# Patient Record
Sex: FEMALE | Race: WHITE | NOT HISPANIC OR LATINO | Employment: FULL TIME | URBAN - METROPOLITAN AREA
[De-identification: names, ages, dates, MRNs, and addresses within clinical notes are randomized per-mention and may not be internally consistent; named-entity substitution may affect disease eponyms.]

---

## 2018-12-17 ENCOUNTER — OFFICE VISIT CONVERTED (OUTPATIENT)
Dept: FAMILY MEDICINE CLINIC | Facility: CLINIC | Age: 18
End: 2018-12-17
Attending: NURSE PRACTITIONER

## 2019-06-03 ENCOUNTER — OFFICE VISIT CONVERTED (OUTPATIENT)
Dept: FAMILY MEDICINE CLINIC | Facility: CLINIC | Age: 19
End: 2019-06-03
Attending: NURSE PRACTITIONER

## 2019-07-24 ENCOUNTER — OFFICE VISIT CONVERTED (OUTPATIENT)
Dept: FAMILY MEDICINE CLINIC | Facility: CLINIC | Age: 19
End: 2019-07-24
Attending: NURSE PRACTITIONER

## 2019-08-16 ENCOUNTER — OFFICE VISIT CONVERTED (OUTPATIENT)
Dept: FAMILY MEDICINE CLINIC | Facility: CLINIC | Age: 19
End: 2019-08-16
Attending: NURSE PRACTITIONER

## 2019-10-18 ENCOUNTER — CONVERSION ENCOUNTER (OUTPATIENT)
Dept: FAMILY MEDICINE CLINIC | Facility: CLINIC | Age: 19
End: 2019-10-18

## 2019-10-18 ENCOUNTER — OFFICE VISIT CONVERTED (OUTPATIENT)
Dept: FAMILY MEDICINE CLINIC | Facility: CLINIC | Age: 19
End: 2019-10-18
Attending: NURSE PRACTITIONER

## 2019-12-16 ENCOUNTER — HOSPITAL ENCOUNTER (OUTPATIENT)
Dept: FAMILY MEDICINE CLINIC | Facility: CLINIC | Age: 19
Discharge: HOME | End: 2019-12-16
Attending: NURSE PRACTITIONER

## 2019-12-16 ENCOUNTER — CONVERSION ENCOUNTER (OUTPATIENT)
Dept: FAMILY MEDICINE CLINIC | Facility: CLINIC | Age: 19
End: 2019-12-16

## 2019-12-16 ENCOUNTER — OFFICE VISIT CONVERTED (OUTPATIENT)
Dept: FAMILY MEDICINE CLINIC | Facility: CLINIC | Age: 19
End: 2019-12-16
Attending: NURSE PRACTITIONER

## 2019-12-17 LAB
C TRACH RRNA CVX QL NAA+PROBE: NOT DETECTED
N GONORRHOEA DNA SPEC QL NAA+PROBE: NOT DETECTED

## 2019-12-18 LAB
CONV LAST MENSTURAL PERIOD: NORMAL
SPECIMEN SOURCE: NORMAL
SPECIMEN SOURCE: NORMAL
THIN PREP CVX: NORMAL

## 2020-01-10 ENCOUNTER — OFFICE VISIT CONVERTED (OUTPATIENT)
Dept: FAMILY MEDICINE CLINIC | Facility: CLINIC | Age: 20
End: 2020-01-10
Attending: NURSE PRACTITIONER

## 2020-01-10 ENCOUNTER — HOSPITAL ENCOUNTER (OUTPATIENT)
Dept: FAMILY MEDICINE CLINIC | Facility: CLINIC | Age: 20
Discharge: HOME | End: 2020-01-10
Attending: NURSE PRACTITIONER

## 2020-01-10 LAB
ALBUMIN SERPL-MCNC: 4.2 G/DL (ref 3.5–5)
ALBUMIN/GLOB SERPL: 1.4 {RATIO} (ref 1.4–2.6)
ALP SERPL-CCNC: 75 U/L (ref 42–98)
ALT SERPL-CCNC: 25 U/L (ref 10–40)
ANION GAP SERPL CALC-SCNC: 17 MMOL/L (ref 8–19)
AST SERPL-CCNC: 23 U/L (ref 15–50)
BASOPHILS # BLD AUTO: 0.05 10*3/UL (ref 0–0.2)
BASOPHILS NFR BLD AUTO: 0.7 % (ref 0–3)
BILIRUB SERPL-MCNC: 0.51 MG/DL (ref 0.2–1.3)
BUN SERPL-MCNC: 18 MG/DL (ref 5–25)
BUN/CREAT SERPL: 25 {RATIO} (ref 6–20)
CALCIUM SERPL-MCNC: 9.8 MG/DL (ref 8.7–10.4)
CHLORIDE SERPL-SCNC: 102 MMOL/L (ref 99–111)
CONV ABS IMM GRAN: 0.01 10*3/UL (ref 0–0.2)
CONV CO2: 24 MMOL/L (ref 22–32)
CONV IMMATURE GRAN: 0.1 % (ref 0–1.8)
CONV TOTAL PROTEIN: 7.3 G/DL (ref 6.3–8.2)
CREAT UR-MCNC: 0.73 MG/DL (ref 0.5–0.9)
DEPRECATED RDW RBC AUTO: 42.5 FL (ref 36.4–46.3)
EOSINOPHIL # BLD AUTO: 0.22 10*3/UL (ref 0–0.7)
EOSINOPHIL # BLD AUTO: 3.1 % (ref 0–7)
ERYTHROCYTE [DISTWIDTH] IN BLOOD BY AUTOMATED COUNT: 12.5 % (ref 11.7–14.4)
GFR SERPLBLD BASED ON 1.73 SQ M-ARVRAT: >60 ML/MIN/{1.73_M2}
GLOBULIN UR ELPH-MCNC: 3.1 G/DL (ref 2–3.5)
GLUCOSE SERPL-MCNC: 82 MG/DL (ref 65–99)
HCT VFR BLD AUTO: 46.6 % (ref 37–47)
HGB BLD-MCNC: 14.5 G/DL (ref 12–16)
LYMPHOCYTES # BLD AUTO: 2.83 10*3/UL (ref 1–5)
LYMPHOCYTES NFR BLD AUTO: 39.3 % (ref 20–45)
MCH RBC QN AUTO: 28.7 PG (ref 27–31)
MCHC RBC AUTO-ENTMCNC: 31.1 G/DL (ref 33–37)
MCV RBC AUTO: 92.1 FL (ref 81–99)
MONOCYTES # BLD AUTO: 0.6 10*3/UL (ref 0.2–1.2)
MONOCYTES NFR BLD AUTO: 8.3 % (ref 3–10)
NEUTROPHILS # BLD AUTO: 3.5 10*3/UL (ref 2–8)
NEUTROPHILS NFR BLD AUTO: 48.5 % (ref 30–85)
NRBC CBCN: 0 % (ref 0–0.7)
OSMOLALITY SERPL CALC.SUM OF ELEC: 289 MOSM/KG (ref 273–304)
PLATELET # BLD AUTO: 330 10*3/UL (ref 130–400)
PMV BLD AUTO: 9.2 FL (ref 9.4–12.3)
POTASSIUM SERPL-SCNC: 4.1 MMOL/L (ref 3.5–5.3)
RBC # BLD AUTO: 5.06 10*6/UL (ref 4.2–5.4)
SODIUM SERPL-SCNC: 139 MMOL/L (ref 135–147)
TSH SERPL-ACNC: 6.09 M[IU]/L (ref 0.27–4.2)
VIT B12 SERPL-MCNC: 475 PG/ML (ref 211–911)
WBC # BLD AUTO: 7.21 10*3/UL (ref 4.8–10.8)

## 2020-02-15 ENCOUNTER — HOSPITAL ENCOUNTER (OUTPATIENT)
Dept: OTHER | Facility: HOSPITAL | Age: 20
Discharge: HOME | End: 2020-02-15
Attending: NURSE PRACTITIONER

## 2020-02-15 LAB
T4 FREE SERPL-MCNC: 1.2 NG/DL (ref 0.9–1.8)
TSH SERPL-ACNC: 2 M[IU]/L (ref 0.27–4.2)

## 2020-02-18 LAB
CONV ANTI MICROSOMAL AB: 15 IU/ML (ref 0–34)
THYROGLOBULIN ANTIBODY: <1 IU/ML (ref 0–0.9)

## 2020-03-04 ENCOUNTER — CONVERSION ENCOUNTER (OUTPATIENT)
Dept: FAMILY MEDICINE CLINIC | Facility: CLINIC | Age: 20
End: 2020-03-04

## 2020-03-04 ENCOUNTER — OFFICE VISIT CONVERTED (OUTPATIENT)
Dept: FAMILY MEDICINE CLINIC | Facility: CLINIC | Age: 20
End: 2020-03-04
Attending: NURSE PRACTITIONER

## 2020-03-25 ENCOUNTER — TELEMEDICINE CONVERTED (OUTPATIENT)
Dept: FAMILY MEDICINE CLINIC | Facility: CLINIC | Age: 20
End: 2020-03-25
Attending: NURSE PRACTITIONER

## 2020-04-06 ENCOUNTER — TELEMEDICINE CONVERTED (OUTPATIENT)
Dept: FAMILY MEDICINE CLINIC | Facility: CLINIC | Age: 20
End: 2020-04-06
Attending: NURSE PRACTITIONER

## 2020-05-06 ENCOUNTER — TELEMEDICINE CONVERTED (OUTPATIENT)
Dept: FAMILY MEDICINE CLINIC | Facility: CLINIC | Age: 20
End: 2020-05-06
Attending: NURSE PRACTITIONER

## 2020-06-01 ENCOUNTER — TELEMEDICINE CONVERTED (OUTPATIENT)
Dept: FAMILY MEDICINE CLINIC | Facility: CLINIC | Age: 20
End: 2020-06-01
Attending: NURSE PRACTITIONER

## 2020-06-21 ENCOUNTER — HOSPITAL ENCOUNTER (OUTPATIENT)
Dept: LAB | Facility: HOSPITAL | Age: 20
Discharge: HOME | End: 2020-06-21
Attending: EMERGENCY MEDICINE

## 2020-12-15 ENCOUNTER — HOSPITAL ENCOUNTER (OUTPATIENT)
Dept: FAMILY MEDICINE CLINIC | Facility: CLINIC | Age: 20
Discharge: HOME | End: 2020-12-15
Attending: NURSE PRACTITIONER

## 2020-12-16 LAB
T4 FREE SERPL-MCNC: 1.1 NG/DL (ref 0.9–1.8)
TSH SERPL-ACNC: 1.67 M[IU]/L (ref 0.27–4.2)

## 2021-01-13 ENCOUNTER — OFFICE VISIT CONVERTED (OUTPATIENT)
Dept: FAMILY MEDICINE CLINIC | Facility: CLINIC | Age: 21
End: 2021-01-13
Attending: NURSE PRACTITIONER

## 2021-01-13 ENCOUNTER — HOSPITAL ENCOUNTER (OUTPATIENT)
Dept: FAMILY MEDICINE CLINIC | Facility: CLINIC | Age: 21
Discharge: HOME | End: 2021-01-13
Attending: NURSE PRACTITIONER

## 2021-01-14 LAB — SARS-COV-2 RNA SPEC QL NAA+PROBE: NOT DETECTED

## 2021-05-12 NOTE — PROGRESS NOTES
Progress Note      Patient Name: Giselle Talavera   Patient ID: [id number]   Sex: Female   YOB: 2000    Primary Care Provider: Ekaterina JOE    Visit Date: April 6, 2020    Provider: HEATH Dickinson   Location: [facility]   Location Address: [address] [place] [address]  [id number]   Location Phone: [phone]          Chief Complaint  · 1 Month Follow up for Anxiety and Depression      History Of Present Illness  Video Conferencing Visit  Giselle Talavera is a 19 year old /White female who is presenting for evaluation via video conferencing. Verbal consent obtained before beginning visit.   The following staff were present during this visit: Gretchen Delgado CMA   Giselle Talavera is a 19 year old /White female who presents for evaluation and treatment of:      1 Month Follow up  for Anxiety and Depression  Pt wants to talk about her medication and possible changes.  Feels like it works sometimes and then not others.    Pt feels like counseling is going well. Last session was Thursday. next session is Thursday.  Pt feels some better.           flu shot-10/2019       Past Medical History  Disease Name Date Onset Notes   Anxiety and depression 06/03/2019 --    Fatigue, unspecified type --  --    Pap smear for cervical cancer screening 12/16/19 WNL         Past Surgical History  Procedure Name Date Notes   Adenoidectomy --  --    Tonsilectomy --  --          Medication List  Name Date Started Instructions   imiquimod 5 % topical cream in packet 01/10/2020 apply to the affected area(s) before bedtime by topical route 3 times per week and leave on skin for 6 to 10 hours for 30 days   Loestrin Fe 1.5/30 (28-Day) 1.5 mg-30 mcg (21)/75 mg (7) oral tablet 01/10/2020 take 1 tablet by oral route once daily for 90 days   Synthroid 50 mcg oral tablet 03/04/2020 take 1 tablet (50 mcg) by oral route once daily on an empty stomach 30 minutes before breakfast for 90  days   Viibryd 40 mg oral tablet 03/25/2020 take 1 tablet (40 mg) by oral route once daily with food for 90 days   Vistaril 25 mg oral capsule 10/18/2019 take 1 capsule by oral route 3 times a day as needed anxiety   Vraylar 1.5 mg oral capsule 03/25/2020 take 1 capsule (1.5 mg) by oral route once daily for 30 days   Zyrtec 10 mg oral tablet 10/18/2019 take 1 tablet (10 mg) by oral route once daily for 90 days         Allergy List  Allergen Name Date Reaction Notes   NO KNOWN DRUG ALLERGIES --  --  --          Family Medical History  Disease Name Relative/Age Notes   Heart Disease  --    Hypertension Grandfather (maternal)/  Grandfather (paternal)/  Uncle/   --    Diabetes Grandfather (paternal)/   --          Social History  Finding Status Start/Stop Quantity Notes   Alcohol Never --/-- --  --    Tobacco Never --/-- --  --          Immunizations  NameDate Admin Mfg Trade Name Lot Number Route Inj VIS Given VIS Publication   Crxhoxcyi85/18/2019 [facility] Fluzone Quadrivalent [id number] IM RD 10/18/2019    Comments: NDC: [id number]   Ycvpglrtd26/17/2018 [facility] Fluzone Quadrivalent [id number] IM  12/17/2018 08/07/2015   Comments: Pt tolerated   Tdap12/17/2018 [facility] BOOSTRIX [id number] IM  12/17/2018 02/24/2015   Comments: Pt tolerated         Review of Systems  · Constitutional  o Denies  o : fatigue, fever, weight gain, weight loss, chills  · HENT  o Denies  o : ear pain, sore throat  · Cardiovascular  o Denies  o : chest Pain, palpitations, edema (swelling)  · Respiratory  o Denies  o : frequent cough, shortness of breath  · Gastrointestinal  o Denies  o : nausea, vomiting, changes in bowel habits  · Genitourinary  o Denies  o : dysuria, urinary frequency, urinary urgency, polyuria  · Neurologic  o Denies  o : headache, tingling or numbness, dizziness  · Psychiatric  o Admits  o : anxiety, depression, mood changes  o Denies  o : memory changes, SI/HI      Vitals  Date Time BP Position Site L\R Cuff Size HR RR TEMP (F) WT  HT  BMI kg/m2  "BSA m2 O2 Sat        03/04/2020 09:06 /94 Sitting    91 - R 18 97.9 191lbs 0oz 5'  2\" 34.93 1.95 98 %          Physical Examination  · Constitutional  o Appearance  o : NAD, alert and oriented, pleasant  · Skin and Subcutaneous Tissue  o General Inspection  o : Color normal, no rash, good turgor  · Neurologic  o Mental Status Examination  o :   § Orientation  § : grossly oriented to person, place and time     pt no longer noted to have flat affect.           Assessment  · Anxiety disorder     300.00/F41.9  · Depression     311/F32.9  · Mood disorder     296.90/F39      Plan  · Orders  o ACO-39: Current medications updated and reviewed () - - 04/06/2020  · Medications  o Vraylar 1.5 mg oral capsule   SIG: take 1 capsule (1.5 mg) by oral route once daily for 30 days   DISP: (30) capsules with 0 refills  Refilled on 04/06/2020     o Medications have been Reconciled  o Transition of Care or Provider Policy  · Instructions  o Patient was given an SSRI/SSNRI medication and warned of possible side effects of the medication including potential for increased risk of suicidal thoughts and feelings. Patient was instructed that if they begin to exhibit any of these effects they will discontinue the medication immediately and contact our office or the ER ASAP.  o Patient was educated/instructed on their diagnosis, treatment and medications prior to discharge from the clinic today.  · Disposition  o Return to Office in 1 month.            Electronically Signed by: HEATH Dickinson -Author on April 6, 2020 02:47:31 PM  "

## 2021-05-13 NOTE — PROGRESS NOTES
Progress Note      Patient Name: Giselle Talavera   Patient ID: [id number]   Sex: Female   YOB: 2000    Primary Care Provider: Ekaterina JOE    Visit Date: June 1, 2020    Provider: HEATH Dickinson   Location: [facility]   Location Address: [address] [place] [address]  [id number]   Location Phone: [phone]          Chief Complaint  · Discuss Medications      History Of Present Illness  Video Conferencing Visit  Giselle Talavera is a 19 year old /White female who is presenting for evaluation via video conferencing via Zoom. Verbal consent obtained before beginning visit.   The following staff were present during this visit: Gretchen Delgado CMA   Giselle Talavera is a 19 year old /White female who presents for evaluation and treatment of:      Follow up to discuss medications for Anxiety and Depression.  Pt reported she was doing well on her meds, but her Therapist Janie Sainz with [facility] thought pt is experiencing Hypomania.    Pt says she has a hard time sitting still. trouble focusing on school work. pt report compliance with meds.     hives - reports hives on forearms intermittantly. worse when around ovens at work and then spreads when she is anxious. denies any new lotions, soaps or detergents. only a dog in the house.       Past Medical History  Disease Name Date Onset Notes   Anxiety and depression 06/03/2019 --    Fatigue, unspecified type --  --    Pap smear for cervical cancer screening 12/16/19 WNL         Past Surgical History  Procedure Name Date Notes   Adenoidectomy --  --    Tonsilectomy --  --          Medication List  Name Date Started Instructions   imiquimod 5 % topical cream in packet 01/10/2020 apply to the affected area(s) before bedtime by topical route 3 times per week and leave on skin for 6 to 10 hours for 30 days   Loestrin Fe 1.5/30 (28-Day) 1.5 mg-30 mcg (21)/75 mg (7) oral tablet 01/10/2020 take 1  tablet by oral route once daily for 90 days   Synthroid 50 mcg oral tablet 03/04/2020 take 1 tablet (50 mcg) by oral route once daily on an empty stomach 30 minutes before breakfast for 90 days   Viibryd 40 mg oral tablet 05/06/2020 take 1 tablet (40 mg) by oral route once daily with food for 90 days   Vistaril 25 mg oral capsule 10/18/2019 take 1 capsule by oral route 3 times a day as needed anxiety   Vraylar 1.5 mg oral capsule 05/06/2020 take 1 capsule (1.5 mg) by oral route once daily for 30 days   Zyrtec 10 mg oral tablet 10/18/2019 take 1 tablet (10 mg) by oral route once daily for 90 days         Allergy List  Allergen Name Date Reaction Notes   NO KNOWN DRUG ALLERGIES --  --  --          Family Medical History  Disease Name Relative/Age Notes   Heart Disease  --    Hypertension Grandfather (maternal)/  Grandfather (paternal)/  Uncle/   --    Diabetes Grandfather (paternal)/   --          Social History  Finding Status Start/Stop Quantity Notes   Alcohol Never --/-- --  --    Tobacco Never --/-- --  --          Immunizations  NameDate Admin Mfg Trade Name Lot Number Route Inj VIS Given VIS Publication   [id number]/18/2019 [facility] Fluzone Quadrivalent [id number] IM RD 10/18/2019    Comments: NDC: [id number]   Qoytbexah05/17/2018 [facility] Fluzone Quadrivalent [id number] IM  12/17/2018 08/07/2015   Comments: Pt tolerated   Tdap12/17/2018 [facility] BOOSTRIX [id number] IM  12/17/2018 02/24/2015   Comments: Pt tolerated         Review of Systems  · Constitutional  o Denies  o : fever, fatigue, weight loss, weight gain  · Cardiovascular  o Denies  o : lower extremity edema, claudication, chest pressure, palpitations  · Respiratory  o Denies  o : shortness of breath, wheezing, frequent cough, hemoptysis, dyspnea on exertion  · Gastrointestinal  o Denies  o : nausea, vomiting, diarrhea, constipation, abdominal pain  · Integument  o Admits  o : rash  · Psychiatric  o Admits  o : anxiety, depression  o Denies  o : suicidal ideation, homicidal  "ideation  · Allergic-Immunologic  o Admits  o : seasonal allergies  o Denies  o : eczema, urticaria      Vitals  Date Time BP Position Site L\R Cuff Size HR RR TEMP (F) WT  HT  BMI kg/m2 BSA m2 O2 Sat        03/04/2020 09:06 /94 Sitting    91 - R 18 97.9 191lbs 0oz 5'  2\" 34.93 1.95 98 %          Physical Examination  · Constitutional  o Appearance  o : NAD, alert and oriented, pleasant  · Respiratory  o Respiratory Effort  o : breathing unlabored, no accessory muscle use  · Skin and Subcutaneous Tissue  o General Inspection  o : Color normal, no rash, good turgor  · Neurologic  o Mental Status Examination  o :   § Orientation  § : grossly oriented to person, place and time          Assessment  · Anxiety and depression       Anxiety disorder, unspecified     300.00/F41.9  Major depressive disorder, single episode, unspecified     300.00/F32.9  · 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      Plan  · Orders  o ACO-39: Current medications updated and reviewed () - - 06/01/2020  · Medications  o Singulair 10 mg oral tablet   SIG: take 1 tablet (10 mg) by oral route once daily in the evening for 30 days   DISP: (30) tablets with 2 refills  Prescribed on 06/01/2020     o Viibryd 20 mg oral tablet   SIG: take 1 tablet (20 mg) by oral route once daily with food for 90 days   DISP: (90) tablets with 1 refills  Adjusted on 06/01/2020     o Vraylar 3 mg oral capsule   SIG: take 1 capsule by oral route daily for 30 days   DISP: (30) capsules with 1 refills  Adjusted on 06/01/2020     o Medications have been Reconciled  o Transition of Care or Provider Policy  · Instructions  o Patient was educated/instructed on their diagnosis, treatment and medications prior to discharge from the clinic today.  o Call the office with any concerns or questions.  · Disposition  o Follow up in 4-6 weeks            Electronically Signed by: HEATH Dickinson -Author on June 1, 2020 01:56:21 PM  "

## 2021-05-13 NOTE — PROGRESS NOTES
"   Progress Note      Patient Name: Giselle Talavera   Patient ID: [id number]   Sex: Female   YOB: 2000    Primary Care Provider: Ekaterina JOE    Visit Date: May 6, 2020    Provider: HEATH Dickinson   Location: [facility]   Location Address: [address] [place] [address]  [id number]   Location Phone: [phone]          Chief Complaint  · 1 Month Follow up for Anxiety and Depression      History Of Present Illness  Video Conferencing Visit  Giselle Talavera is a 19 year old /White female who is presenting for evaluation via video conferencing. Verbal consent obtained before beginning visit.   The following staff were present during this visit: Gretchen Delgado CMA   Giselle Talavera is a 19 year old /White female who presents for evaluation and treatment of:      1 Month Follow up  for Anxiety and Depression  Started on Viibryd and Vraylar. Pt said she feels she is starting to show symptoms of dimitrios. Pt reports being happy most of the time. Pt admits to some sad times and the other day \"having a mental breakdown\". pt reports she was tired and cried a lot.  Pt denies any si/hi.  Pt continues with counseling and is doing well. Last session was Thursday.  Pt is sleeping at night. Pt has not made any big unusual purchases.  But otherwise doing good on the medicine.      flu shot-10/2019         Past Medical History  Disease Name Date Onset Notes   Anxiety and depression 06/03/2019 --    Fatigue, unspecified type --  --    Pap smear for cervical cancer screening 12/16/19 WNL         Past Surgical History  Procedure Name Date Notes   Adenoidectomy --  --    Tonsilectomy --  --          Medication List  Name Date Started Instructions   imiquimod 5 % topical cream in packet 01/10/2020 apply to the affected area(s) before bedtime by topical route 3 times per week and leave on skin for 6 to 10 hours for 30 days   Loestrin Fe 1.5/30 (28-Day) 1.5 mg-30 mcg (21)/75 mg " (7) oral tablet 01/10/2020 take 1 tablet by oral route once daily for 90 days   Synthroid 50 mcg oral tablet 03/04/2020 take 1 tablet (50 mcg) by oral route once daily on an empty stomach 30 minutes before breakfast for 90 days   Viibryd 40 mg oral tablet 05/06/2020 take 1 tablet (40 mg) by oral route once daily with food for 90 days   Vistaril 25 mg oral capsule 10/18/2019 take 1 capsule by oral route 3 times a day as needed anxiety   Vraylar 1.5 mg oral capsule 05/06/2020 take 1 capsule (1.5 mg) by oral route once daily for 30 days   Zyrtec 10 mg oral tablet 10/18/2019 take 1 tablet (10 mg) by oral route once daily for 90 days         Allergy List  Allergen Name Date Reaction Notes   NO KNOWN DRUG ALLERGIES --  --  --          Family Medical History  Disease Name Relative/Age Notes   Heart Disease  --    Hypertension Grandfather (maternal)/  Grandfather (paternal)/  Uncle/   --    Diabetes Grandfather (paternal)/   --          Social History  Finding Status Start/Stop Quantity Notes   Alcohol Never --/-- --  --    Tobacco Never --/-- --  --          Immunizations  NameDate Admin Mfg Trade Name Lot Number Route Inj VIS Given VIS Publication   [id number]/18/2019 [facility] Fluzone Quadrivalent [id number] IM RD 10/18/2019    Comments: NDC: [id number]   [id number]/17/2018 [facility] Fluzone Quadrivalent [id number] IM  12/17/2018 08/07/2015   Comments: Pt tolerated   Tdap12/17/2018 [facility] BOOSTRIX [id number] IM  12/17/2018 02/24/2015   Comments: Pt tolerated         Review of Systems  · Constitutional  o Denies  o : fatigue, fever, weight gain, weight loss, chills  · Cardiovascular  o Denies  o : chest Pain, palpitations, edema (swelling)  · Respiratory  o Denies  o : frequent cough, shortness of breath  · Gastrointestinal  o Denies  o : nausea, vomiting, changes in bowel habits  · Neurologic  o Denies  o : headache, tingling or numbness, dizziness  · Psychiatric  o Admits  o : anxiety, depression  o Denies  o : mood changes, memory changes,  SI/HI  · Allergic-Immunologic  o Denies  o : eczema, seasonal allergies, urticaria      Physical Examination  · Constitutional  o Appearance  o : NAD, alert and oriented, pleasant  · Respiratory  o Respiratory Effort  o : breathing unlabored, no accessory muscle use  · Skin and Subcutaneous Tissue  o General Inspection  o : Color normal, no rash, good turgor  · Neurologic  o Mental Status Examination  o :   § Orientation  § : grossly oriented to person, place and time          Assessment  · Anxiety disorder     300.00/F41.9  · Depression     311/F32.9      Plan  · Orders  o ACO-39: Current medications updated and reviewed () - - 05/06/2020  · Medications  o Viibryd 40 mg oral tablet   SIG: take 1 tablet (40 mg) by oral route once daily with food for 90 days   DISP: (90) tablets with 1 refills  Adjusted on 05/06/2020     o Vraylar 1.5 mg oral capsule   SIG: take 1 capsule (1.5 mg) by oral route once daily for 30 days   DISP: (30) capsules with 5 refills  Adjusted on 05/06/2020     o Medications have been Reconciled  o Transition of Care or Provider Policy  · Instructions  o Patient was given an SSRI/SSNRI medication and warned of possible side effects of the medication including potential for increased risk of suicidal thoughts and feelings. Patient was instructed that if they begin to exhibit any of these effects they will discontinue the medication immediately and contact our office or the ER ASAP.  o Patient was educated/instructed on their diagnosis, treatment and medications prior to discharge from the clinic today.  o Call the office with any concerns or questions.  · Disposition  o Return to Office in 4 months.            Electronically Signed by: HEATH Dickinson -Author on May 6, 2020 02:40:15 PM

## 2021-05-14 VITALS
SYSTOLIC BLOOD PRESSURE: 137 MMHG | WEIGHT: 199.5 LBS | DIASTOLIC BLOOD PRESSURE: 82 MMHG | TEMPERATURE: 96 F | OXYGEN SATURATION: 97 % | HEART RATE: 94 BPM | HEIGHT: 62 IN | BODY MASS INDEX: 36.71 KG/M2

## 2021-05-14 NOTE — PROGRESS NOTES
Progress Note      Patient Name: Giselle Talavera   Patient ID: [id number]   Sex: Female   YOB: 2000    Primary Care Provider: Ekaterina JOE   Referring Provider: Ekaterina JOE    Visit Date: January 13, 2021    Provider: HEATH Dickinson   Location: [facility]   Location Address: [address] [place] [address]  [id number]   Location Phone: [phone]          Chief Complaint  · 1 Month Follow up for Anxiety, Depression and Hypothyroidism      History Of Present Illness  Giselle Talavera is a 20 year old /White female who presents for evaluation and treatment of:      1 Month Follow up  for Anxiety, Depression and Hypothyroidism  Last lab work done 12/15/20  Med refills needed    Pt also requested to have COVID test done for School.     LMP: 1.9.21, lasting 3 days with normal flow. denies btb.  pap 10.2019    flu shot- given today 1/13/2020     anxiety - pt is seeing psychiatry. taking Effexor, lamictal, buspar and Inderal prn.       Past Medical History  Disease Name Date Onset Notes   Anxiety and depression 06/03/2019 --    Fatigue, unspecified type --  --    Hypothyroidism --  --    Pap smear for cervical cancer screening 12/16/19 WNL         Past Surgical History  Procedure Name Date Notes   Adenoidectomy --  --    Tonsilectomy --  --          Medication List  Name Date Started Instructions   Loestrin Fe 1.5/30 (28-Day) 1.5 mg-30 mcg (21)/75 mg (7) oral tablet 01/13/2021 take 1 tablet by oral route once daily for 90 days   Singulair 10 mg oral tablet 06/01/2020 take 1 tablet (10 mg) by oral route once daily in the evening for 30 days   Synthroid 50 mcg oral tablet 01/13/2021 take 1 tablet (50 mcg) by oral route once daily on an empty stomach 30 minutes before breakfast for 90 days   Zyrtec 10 mg oral tablet 01/13/2021 take 1 tablet (10 mg) by oral route once daily for 90 days         Allergy List  Allergen Name Date Reaction Notes   NO  "KNOWN DRUG ALLERGIES --  --  --          Family Medical History  Disease Name Relative/Age Notes   Heart Disease  --    Hypertension Grandfather (maternal)/  Grandfather (paternal)/  Uncle/   --    Diabetes Grandfather (paternal)/   --          Social History  Finding Status Start/Stop Quantity Notes   Alcohol Never --/-- --  --    Tobacco Never --/-- --  --          Immunizations  NameDate Admin Mfg Trade Name Lot Number Route Inj VIS Given VIS Publication   Usksigbrs04/13/2021 PMC Fluzone Quadrivalent [id number] IM RD 01/13/2021 08/15/2019   Comments: pt tolerated well NDC [id number]   Tdap12/17/2018 SKB BOOSTRIX [id number] IM  12/17/2018 02/24/2015   Comments: Pt tolerated         Review of Systems  · Constitutional  o Denies  o : fatigue  · HENT  o Denies  o : headaches  · Cardiovascular  o Denies  o : chest pain, irregular heart beats, rapid heart rate, dyspnea on exertion  · Respiratory  o Denies  o : shortness of breath, wheezing, cough  · Gastrointestinal  o Denies  o : nausea, vomiting, diarrhea, constipation, abdominal pain, blood in stools, melena  · Genitourinary  o Denies  o : frequency, dysuria, hematuria  · Integument  o Denies  o : rash, new skin lesions  · Psychiatric  o Admits  o : anxiety, depression  o Denies  o : suicidal ideation, homicidal ideation      Vitals  Date Time BP Position Site L\R Cuff Size HR RR TEMP (F) WT  HT  BMI kg/m2 BSA m2 O2 Sat FR L/min FiO2 HC       01/10/2020 11:58 /82 Sitting    88 - R 12  186lbs 2oz 5'  2\" 34.04 1.92 98 %      03/04/2020 09:06 /94 Sitting    91 - R 18 97.9 191lbs 0oz 5'  2\" 34.93 1.95 98 %      01/13/2021 02:18 /82 Sitting    94 - R  96 199lbs 8oz 5'  2\" 36.49 1.99 97 %  21%          Physical Examination  · Constitutional  o Appearance  o : well-nourished, in no acute distress  · Eyes  o Conjunctivae  o : conjunctivae normal  o Sclerae  o : sclerae white  o Pupils and Irises  o : pupils equal and round  o Eyelids/Ocular Adnexae  o : eyelid " appearance normal, no exudates present  · Neck  o Inspection/Palpation  o : normal appearance, no masses or tenderness, trachea midline  o Range of Motion  o : cervical range of motion within normal limits  o Thyroid  o : gland size normal, nontender, no nodules or masses present on palpation  · Respiratory  o Respiratory Effort  o : breathing unlabored  o Inspection of Chest  o : normal appearance  o Auscultation of Lungs  o : normal breath sounds throughout inspiration and expiration  · Cardiovascular  o Heart  o :   § Auscultation of Heart  § : regular rate and rhythm, no murmurs, gallops or rubs  · Gastrointestinal  o Abdominal Examination  o : abdomen nontender to palpation, tone normal without rigidity or guarding, no masses present, bowel sounds present in all four quadrants  · Lymphatic  o Neck  o : no lymphadenopathy present  · Skin and Subcutaneous Tissue  o General Inspection  o : no rashes or lesions present, no areas of discoloration  o Body Hair  o : hair normal for age, general body hair distribution normal for age  o Digits and Nails  o : no clubbing, cyanosis, deformities or edema present, normal appearing nails  · Neurologic  o Mental Status Examination  o :   § Orientation  § : grossly oriented to person, place and time  o Gait and Station  o : normal gait, able to stand without difficulty  · Psychiatric  o Judgement and Insight  o : judgment and insight intact  o Mood and Affect  o : mood normal, affect appropriate          Assessment  · Need for influenza vaccination     V04.81/Z23  · Anxiety disorder     300.00/F41.9  · Depression     311/F32.9  · Encounter for contraceptive management     V25.9/Z30.9  · Hypothyroidism     244.9/E03.9  · Screening examination for infectious disease     V75.9/Z11.9      Plan  · Orders  o Immunization Admin Fee (Single) ([facility]) (91607) - V04.81/Z23 - 01/13/2021  o Fluzone Quadrivalent Vaccine, age 6 months + (74285) - V04.81/Z23 - 01/13/2021   Vaccine - Influenza;  Dose: 0.5; Site: Right Deltoid; Route: Intramuscular; Date: 01/13/2021 14:59:00; Exp: 06/30/2021; Lot: [id number]; Mfg: sanofi pasteur; TradeName: Fluzone Quadrivalent; Administered By: Teresita Gil MA; Comment: pt tolerated well NDC [id number]  o ACO-14: Influenza immunization administered or previously received () - V04.81/Z23 - 01/13/2021  o ACO-39: Current medications updated and reviewed (, 1159F) - - 01/13/2021  o [facility] NCOV2 (send-out) (38398) - - 01/13/2021  · Medications  o Loestrin Fe 1.5/30 (28-Day) 1.5 mg-30 mcg (21)/75 mg (7) oral tablet   SIG: take 1 tablet by oral route once daily for 90 days   DISP: (3) Package with 3 refills  Refilled on 01/13/2021     o Synthroid 50 mcg oral tablet   SIG: take 1 tablet (50 mcg) by oral route once daily on an empty stomach 30 minutes before breakfast for 90 days   DISP: (90) Tablet with 1 refills  Refilled on 01/13/2021     o Zyrtec 10 mg oral tablet   SIG: take 1 tablet (10 mg) by oral route once daily for 90 days   DISP: (90) Tablet with 3 refills  Refilled on 01/13/2021     o Medications have been Reconciled  o Transition of Care or Provider Policy  · Instructions  o Birth control pills need to be taken at the same time daily. Vomiting, diarrhea, antibiotics and seizure medication make these pills less effective. If any of these happen during a pack use other form of birth control until start a new pack. Break through bleeding is any bleeding during the active pills in the pack. If this occurs use another form of birth control. If you miss a pill or take one late use another form of birth control until you start a new pack. Abdominal Pain, Chest pain, headaches (not relieved by Tylenol), Leg pain or vision changes need to be reported immediately to your provider.   o Patient was educated/instructed on their diagnosis, treatment and medications prior to discharge from the clinic today.  o Call the office with any concerns or  questions.  · Disposition  o Return to Office in 1 year.            Electronically Signed by: HEATH Dickinson -Author on February 19, 2021 10:58:47 AM

## 2021-05-15 VITALS
HEART RATE: 84 BPM | WEIGHT: 178 LBS | OXYGEN SATURATION: 98 % | HEIGHT: 62 IN | BODY MASS INDEX: 32.76 KG/M2 | DIASTOLIC BLOOD PRESSURE: 84 MMHG | RESPIRATION RATE: 22 BRPM | TEMPERATURE: 98.1 F | SYSTOLIC BLOOD PRESSURE: 118 MMHG

## 2021-05-15 VITALS
BODY MASS INDEX: 32.76 KG/M2 | HEART RATE: 77 BPM | HEIGHT: 62 IN | TEMPERATURE: 98.1 F | SYSTOLIC BLOOD PRESSURE: 122 MMHG | WEIGHT: 178 LBS | OXYGEN SATURATION: 98 % | RESPIRATION RATE: 20 BRPM | DIASTOLIC BLOOD PRESSURE: 72 MMHG

## 2021-05-15 VITALS
HEIGHT: 62 IN | WEIGHT: 186.12 LBS | BODY MASS INDEX: 34.25 KG/M2 | HEART RATE: 88 BPM | OXYGEN SATURATION: 98 % | SYSTOLIC BLOOD PRESSURE: 132 MMHG | DIASTOLIC BLOOD PRESSURE: 82 MMHG | RESPIRATION RATE: 12 BRPM

## 2021-05-15 VITALS
SYSTOLIC BLOOD PRESSURE: 126 MMHG | HEART RATE: 91 BPM | BODY MASS INDEX: 35.15 KG/M2 | WEIGHT: 191 LBS | OXYGEN SATURATION: 98 % | DIASTOLIC BLOOD PRESSURE: 94 MMHG | HEIGHT: 62 IN | TEMPERATURE: 97.9 F | RESPIRATION RATE: 18 BRPM

## 2021-05-15 VITALS
HEART RATE: 91 BPM | OXYGEN SATURATION: 96 % | BODY MASS INDEX: 32.82 KG/M2 | HEIGHT: 62 IN | DIASTOLIC BLOOD PRESSURE: 85 MMHG | RESPIRATION RATE: 12 BRPM | SYSTOLIC BLOOD PRESSURE: 129 MMHG | WEIGHT: 178.37 LBS

## 2021-05-15 VITALS
TEMPERATURE: 97.8 F | RESPIRATION RATE: 24 BRPM | HEART RATE: 61 BPM | WEIGHT: 164 LBS | OXYGEN SATURATION: 97 % | SYSTOLIC BLOOD PRESSURE: 128 MMHG | BODY MASS INDEX: 30.18 KG/M2 | DIASTOLIC BLOOD PRESSURE: 73 MMHG | HEIGHT: 62 IN

## 2021-05-15 VITALS
HEART RATE: 116 BPM | SYSTOLIC BLOOD PRESSURE: 128 MMHG | HEIGHT: 62 IN | WEIGHT: 188 LBS | OXYGEN SATURATION: 96 % | BODY MASS INDEX: 34.6 KG/M2 | DIASTOLIC BLOOD PRESSURE: 93 MMHG | RESPIRATION RATE: 12 BRPM

## 2021-05-15 VITALS
SYSTOLIC BLOOD PRESSURE: 128 MMHG | OXYGEN SATURATION: 98 % | WEIGHT: 162 LBS | HEIGHT: 62 IN | TEMPERATURE: 98.1 F | DIASTOLIC BLOOD PRESSURE: 85 MMHG | HEART RATE: 87 BPM | BODY MASS INDEX: 29.81 KG/M2 | RESPIRATION RATE: 30 BRPM

## 2021-11-16 ENCOUNTER — TELEPHONE (OUTPATIENT)
Dept: FAMILY MEDICINE CLINIC | Facility: CLINIC | Age: 21
End: 2021-11-16

## 2021-11-16 DIAGNOSIS — Z30.40 ENCOUNTER FOR SURVEILLANCE OF CONTRACEPTIVES, UNSPECIFIED CONTRACEPTIVE: Primary | ICD-10-CM

## 2021-11-16 RX ORDER — NORETHINDRONE ACETATE AND ETHINYL ESTRADIOL .03; 1.5 MG/1; MG/1
1 TABLET ORAL DAILY
Qty: 90 EACH | Refills: 1 | Status: SHIPPED | OUTPATIENT
Start: 2021-11-16 | End: 2022-06-22 | Stop reason: SDUPTHER

## 2021-11-16 NOTE — TELEPHONE ENCOUNTER
Caller: Giselle Talavera    Relationship: Self    Best call back number: [phone]    Requested Prescriptions:   LOESTRIN 1.5MG     Pharmacy where request should be sent: [facility] [place] [facility] -  [place], [place] - [address] [facility] - [phone]  - [phone] FX     THE PATIENT WOULD LIKE TO BE NOTIFIED WHEN THIS MEDICATION HAS BEEN SENT TO THE PHARMACY.      Does the patient have less than a 3 day supply:  [] Yes  [x] No    Eufemia Mann Rep   11/16/21 10:11 EST

## 2021-12-09 ENCOUNTER — OFFICE VISIT (OUTPATIENT)
Dept: FAMILY MEDICINE CLINIC | Facility: CLINIC | Age: 21
End: 2021-12-09

## 2021-12-09 VITALS
WEIGHT: 187 LBS | DIASTOLIC BLOOD PRESSURE: 80 MMHG | SYSTOLIC BLOOD PRESSURE: 121 MMHG | TEMPERATURE: 98.2 F | HEIGHT: 62 IN | HEART RATE: 85 BPM | BODY MASS INDEX: 34.41 KG/M2 | OXYGEN SATURATION: 99 %

## 2021-12-09 DIAGNOSIS — E03.9 HYPOTHYROIDISM, UNSPECIFIED TYPE: ICD-10-CM

## 2021-12-09 DIAGNOSIS — F41.9 ANXIETY AND DEPRESSION: ICD-10-CM

## 2021-12-09 DIAGNOSIS — F32.A ANXIETY AND DEPRESSION: ICD-10-CM

## 2021-12-09 DIAGNOSIS — Z79.899 MEDICATION MANAGEMENT: ICD-10-CM

## 2021-12-09 DIAGNOSIS — Z30.09 ENCOUNTER FOR OTHER GENERAL COUNSELING OR ADVICE ON CONTRACEPTION: ICD-10-CM

## 2021-12-09 DIAGNOSIS — Z00.00 ANNUAL PHYSICAL EXAM: Primary | ICD-10-CM

## 2021-12-09 PROBLEM — R53.83 FATIGUE: Status: ACTIVE | Noted: 2021-12-09

## 2021-12-09 PROBLEM — Z12.4 PAP SMEAR FOR CERVICAL CANCER SCREENING: Status: ACTIVE | Noted: 2019-12-16

## 2021-12-09 LAB
ALBUMIN SERPL-MCNC: 4.4 G/DL (ref 3.5–5.2)
ALBUMIN/GLOB SERPL: 1.8 G/DL
ALP SERPL-CCNC: 60 U/L (ref 39–117)
ALT SERPL W P-5'-P-CCNC: 21 U/L (ref 1–33)
ANION GAP SERPL CALCULATED.3IONS-SCNC: 12.8 MMOL/L (ref 5–15)
AST SERPL-CCNC: 19 U/L (ref 1–32)
BASOPHILS # BLD AUTO: 0.04 10*3/MM3 (ref 0–0.2)
BASOPHILS NFR BLD AUTO: 0.5 % (ref 0–1.5)
BILIRUB SERPL-MCNC: 0.8 MG/DL (ref 0–1.2)
BUN SERPL-MCNC: 10 MG/DL (ref 6–20)
BUN/CREAT SERPL: 11.9 (ref 7–25)
CALCIUM SPEC-SCNC: 9.8 MG/DL (ref 8.6–10.5)
CHLORIDE SERPL-SCNC: 104 MMOL/L (ref 98–107)
CHOLEST SERPL-MCNC: 183 MG/DL (ref 0–200)
CO2 SERPL-SCNC: 22.2 MMOL/L (ref 22–29)
CREAT SERPL-MCNC: 0.84 MG/DL (ref 0.57–1)
DEPRECATED RDW RBC AUTO: 40.6 FL (ref 37–54)
EOSINOPHIL # BLD AUTO: 0.03 10*3/MM3 (ref 0–0.4)
EOSINOPHIL NFR BLD AUTO: 0.4 % (ref 0.3–6.2)
ERYTHROCYTE [DISTWIDTH] IN BLOOD BY AUTOMATED COUNT: 13.4 % (ref 12.3–15.4)
GFR SERPL CREATININE-BSD FRML MDRD: 84 ML/MIN/1.73
GLOBULIN UR ELPH-MCNC: 2.5 GM/DL
GLUCOSE SERPL-MCNC: 77 MG/DL (ref 65–99)
HBA1C MFR BLD: 5.11 % (ref 4.8–5.6)
HCT VFR BLD AUTO: 38.4 % (ref 34–46.6)
HDLC SERPL-MCNC: 49 MG/DL (ref 40–60)
HGB BLD-MCNC: 13.2 G/DL (ref 12–15.9)
IMM GRANULOCYTES # BLD AUTO: 0.03 10*3/MM3 (ref 0–0.05)
IMM GRANULOCYTES NFR BLD AUTO: 0.4 % (ref 0–0.5)
LDLC SERPL CALC-MCNC: 122 MG/DL (ref 0–100)
LDLC/HDLC SERPL: 2.49 {RATIO}
LYMPHOCYTES # BLD AUTO: 2.03 10*3/MM3 (ref 0.7–3.1)
LYMPHOCYTES NFR BLD AUTO: 24.1 % (ref 19.6–45.3)
MCH RBC QN AUTO: 29.1 PG (ref 26.6–33)
MCHC RBC AUTO-ENTMCNC: 34.4 G/DL (ref 31.5–35.7)
MCV RBC AUTO: 84.6 FL (ref 79–97)
MONOCYTES # BLD AUTO: 0.48 10*3/MM3 (ref 0.1–0.9)
MONOCYTES NFR BLD AUTO: 5.7 % (ref 5–12)
NEUTROPHILS NFR BLD AUTO: 5.81 10*3/MM3 (ref 1.7–7)
NEUTROPHILS NFR BLD AUTO: 68.9 % (ref 42.7–76)
NRBC BLD AUTO-RTO: 0.1 /100 WBC (ref 0–0.2)
PLATELET # BLD AUTO: 335 10*3/MM3 (ref 140–450)
PMV BLD AUTO: 9.5 FL (ref 6–12)
POTASSIUM SERPL-SCNC: 4.4 MMOL/L (ref 3.5–5.2)
PROLACTIN SERPL-MCNC: 18.1 NG/ML (ref 4.79–23.3)
PROT SERPL-MCNC: 6.9 G/DL (ref 6–8.5)
RBC # BLD AUTO: 4.54 10*6/MM3 (ref 3.77–5.28)
SODIUM SERPL-SCNC: 139 MMOL/L (ref 136–145)
T4 FREE SERPL-MCNC: 1.56 NG/DL (ref 0.93–1.7)
TRIGL SERPL-MCNC: 61 MG/DL (ref 0–150)
TSH SERPL DL<=0.05 MIU/L-ACNC: 1.1 UIU/ML (ref 0.27–4.2)
VLDLC SERPL-MCNC: 12 MG/DL (ref 5–40)
WBC NRBC COR # BLD: 8.42 10*3/MM3 (ref 3.4–10.8)

## 2021-12-09 PROCEDURE — 84443 ASSAY THYROID STIM HORMONE: CPT | Performed by: NURSE PRACTITIONER

## 2021-12-09 PROCEDURE — 80053 COMPREHEN METABOLIC PANEL: CPT | Performed by: NURSE PRACTITIONER

## 2021-12-09 PROCEDURE — 80061 LIPID PANEL: CPT | Performed by: NURSE PRACTITIONER

## 2021-12-09 PROCEDURE — 36415 COLL VENOUS BLD VENIPUNCTURE: CPT | Performed by: NURSE PRACTITIONER

## 2021-12-09 PROCEDURE — 85025 COMPLETE CBC W/AUTO DIFF WBC: CPT | Performed by: NURSE PRACTITIONER

## 2021-12-09 PROCEDURE — 84439 ASSAY OF FREE THYROXINE: CPT | Performed by: NURSE PRACTITIONER

## 2021-12-09 PROCEDURE — 83036 HEMOGLOBIN GLYCOSYLATED A1C: CPT | Performed by: NURSE PRACTITIONER

## 2021-12-09 PROCEDURE — 84146 ASSAY OF PROLACTIN: CPT | Performed by: NURSE PRACTITIONER

## 2021-12-09 PROCEDURE — 99395 PREV VISIT EST AGE 18-39: CPT | Performed by: NURSE PRACTITIONER

## 2021-12-09 RX ORDER — BUPROPION HCL 300 MG
TABLET, EXTENDED RELEASE 24 HR ORAL
COMMUNITY
Start: 2021-09-21 | End: 2023-03-27

## 2021-12-09 RX ORDER — NORETHINDRONE ACETATE AND ETHINYL ESTRADIOL AND FERROUS FUMARATE 1.5-30(21)
KIT ORAL
COMMUNITY
Start: 2021-09-20 | End: 2022-06-22 | Stop reason: SDUPTHER

## 2021-12-09 RX ORDER — LEVOTHYROXINE SODIUM 50 MCG
TABLET ORAL
COMMUNITY
Start: 2021-09-21 | End: 2023-03-27
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
## 2021-12-12 RX ORDER — LEVOTHYROXINE SODIUM 0.1 MG/1
100 TABLET ORAL DAILY
Qty: 90 TABLET | Refills: 3 | Status: SHIPPED | OUTPATIENT
Start: 2021-12-12 | End: 2023-03-27

## 2021-12-21 ENCOUNTER — OFFICE VISIT (OUTPATIENT)
Dept: OBSTETRICS AND GYNECOLOGY | Facility: CLINIC | Age: 21
End: 2021-12-21

## 2021-12-21 VITALS
SYSTOLIC BLOOD PRESSURE: 126 MMHG | BODY MASS INDEX: 31.41 KG/M2 | DIASTOLIC BLOOD PRESSURE: 85 MMHG | HEIGHT: 64 IN | WEIGHT: 184 LBS | HEART RATE: 90 BPM

## 2021-12-21 DIAGNOSIS — Z11.3 SCREEN FOR STD (SEXUALLY TRANSMITTED DISEASE): ICD-10-CM

## 2021-12-21 DIAGNOSIS — T74.21XD SEXUAL ASSAULT OF ADULT, SUBSEQUENT ENCOUNTER: ICD-10-CM

## 2021-12-21 DIAGNOSIS — Z30.017 ENCOUNTER FOR INITIAL PRESCRIPTION OF IMPLANTABLE SUBDERMAL CONTRACEPTIVE: Primary | ICD-10-CM

## 2021-12-21 PROBLEM — T74.21XA SEXUAL ASSAULT OF ADULT: Status: ACTIVE | Noted: 2021-12-21

## 2021-12-21 PROCEDURE — 87591 N.GONORRHOEAE DNA AMP PROB: CPT | Performed by: OBSTETRICS & GYNECOLOGY

## 2021-12-21 PROCEDURE — 99204 OFFICE O/P NEW MOD 45 MIN: CPT | Performed by: OBSTETRICS & GYNECOLOGY

## 2021-12-21 PROCEDURE — 87563 M. GENITALIUM AMP PROBE: CPT | Performed by: OBSTETRICS & GYNECOLOGY

## 2021-12-21 PROCEDURE — 87491 CHLMYD TRACH DNA AMP PROBE: CPT | Performed by: OBSTETRICS & GYNECOLOGY

## 2021-12-21 RX ORDER — BUPROPION HYDROCHLORIDE 150 MG/1
TABLET ORAL
COMMUNITY
Start: 2021-12-10 | End: 2023-03-27
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
## 2021-12-22 LAB
C TRACH RRNA UR QL NAA+PROBE: NORMAL
M GENITALIUM DNA UR QL NAA+PROBE: NORMAL
N GONORRHOEA RRNA UR QL NAA+PROBE: NORMAL

## 2022-03-31 ENCOUNTER — TELEPHONE (OUTPATIENT)
Dept: OBSTETRICS AND GYNECOLOGY | Facility: CLINIC | Age: 22
End: 2022-03-31

## 2022-04-01 ENCOUNTER — TELEPHONE (OUTPATIENT)
Dept: OBSTETRICS AND GYNECOLOGY | Facility: CLINIC | Age: 22
End: 2022-04-01

## 2022-06-22 DIAGNOSIS — Z30.40 ENCOUNTER FOR SURVEILLANCE OF CONTRACEPTIVES, UNSPECIFIED CONTRACEPTIVE: ICD-10-CM

## 2022-06-22 RX ORDER — NORETHINDRONE ACETATE AND ETHINYL ESTRADIOL AND FERROUS FUMARATE 1.5-30(21)
1 KIT ORAL DAILY
Qty: 28 TABLET | Refills: 2 | Status: SHIPPED | OUTPATIENT
Start: 2022-06-22 | End: 2022-06-23 | Stop reason: SDUPTHER

## 2022-06-22 NOTE — TELEPHONE ENCOUNTER
Caller: Giselle Talavera    Relationship: Self    Best call back number: [phone]     Requested Prescriptions:   Requested Prescriptions     Pending Prescriptions Disp Refills   • Loestrin Fe 1.5/30 1.5-30 MG-MCG tablet 28 tablet         Pharmacy where request should be sent: [facility][id number] [address] AT [facility] [phone] [facility] [phone] FX     Additional details provided by patient:     Does the patient have less than a 3 day supply:  [x] Yes  [] No    Eufemia ALDANA Rep   06/22/22 09:01 EDT

## 2022-06-23 RX ORDER — NORETHINDRONE ACETATE AND ETHINYL ESTRADIOL .03; 1.5 MG/1; MG/1
1 TABLET ORAL DAILY
Qty: 90 EACH | Refills: 1 | Status: SHIPPED | OUTPATIENT
Start: 2022-06-23 | End: 2022-10-27 | Stop reason: SDUPTHER

## 2022-06-23 NOTE — TELEPHONE ENCOUNTER
Caller: Giselle Talavera    Relationship to patient: Self    Best call back number: [phone]  OKAY TO LEAVE MESSAGE    Patient is needing: PATIENT CALLED IN AND WOULD LIKE TO KNOW IF SHE COULD BE PRESCRIBED A GENERIC EQUIVALENT FOR Loestrin Fe 1.5/30 1.5-30 MG-MCG tablet THAT IS LESS EXPENSIVE. PLEASE CALL AND ADVISE.      [id number] - [place], [place] - [address] [facility] - [phone] [facility] [phone] FX

## 2022-10-26 ENCOUNTER — TELEPHONE (OUTPATIENT)
Dept: FAMILY MEDICINE CLINIC | Facility: CLINIC | Age: 22
End: 2022-10-26

## 2022-10-26 DIAGNOSIS — Z30.40 ENCOUNTER FOR SURVEILLANCE OF CONTRACEPTIVES, UNSPECIFIED CONTRACEPTIVE: ICD-10-CM

## 2022-10-26 NOTE — TELEPHONE ENCOUNTER
Caller: Giselle Talavera    Relationship: Self    Best call back number: [phone]    What medication are you requesting: JANETTE 24 FE BIRTH CONTROL        If a prescription is needed, what is your preferred pharmacy and phone number: [facility] #[id number] [place] [address] [facility] AT [facility] - [phone] [facility] [phone]      Additional notes: PHARMACY ADVISED THIS WAS THE SAME AS HER PRIOR BIRTH CONTROL IT IS NOT ON HER MEDICATION LIST, BUT SHE SAYS SHE WAS PRESCRIBED THIS ONCE. PATIENT WILL BE OUT OF MEDICATION ON Friday, PLEASE NOTIFY WHEN SENT

## 2022-10-27 RX ORDER — NORETHINDRONE ACETATE AND ETHINYL ESTRADIOL .03; 1.5 MG/1; MG/1
1 TABLET ORAL DAILY
Qty: 90 EACH | Refills: 0 | Status: SHIPPED | OUTPATIENT
Start: 2022-10-27 | End: 2022-12-29 | Stop reason: SDUPTHER

## 2022-12-29 ENCOUNTER — TELEPHONE (OUTPATIENT)
Dept: FAMILY MEDICINE CLINIC | Facility: CLINIC | Age: 22
End: 2022-12-29

## 2022-12-29 DIAGNOSIS — Z30.40 ENCOUNTER FOR SURVEILLANCE OF CONTRACEPTIVES, UNSPECIFIED CONTRACEPTIVE: ICD-10-CM

## 2022-12-29 RX ORDER — CETIRIZINE HYDROCHLORIDE 10 MG/1
10 TABLET ORAL AS NEEDED
Qty: 90 TABLET | Refills: 2 | Status: SHIPPED | OUTPATIENT
Start: 2022-12-29

## 2022-12-29 RX ORDER — NORETHINDRONE ACETATE AND ETHINYL ESTRADIOL .03; 1.5 MG/1; MG/1
1 TABLET ORAL DAILY
Qty: 90 EACH | Refills: 0 | Status: SHIPPED | OUTPATIENT
Start: 2022-12-29 | End: 2023-03-14 | Stop reason: SDUPTHER

## 2022-12-29 NOTE — TELEPHONE ENCOUNTER
Caller: Giselle Talavera    Relationship: Self    Best call back number: [phone]    What is the best time to reach you: ANYTIME     Who are you requesting to speak with (clinical staff, provider,  specific staff member): NURSE     What was the call regarding: PATIENT IS NEEDING TO HAVE HER BIRTH CONTROL REFILLED  AND SENT TO [facility] IN [place] AT [address]     Do you require a callback: YES

## 2022-12-30 ENCOUNTER — TELEPHONE (OUTPATIENT)
Dept: FAMILY MEDICINE CLINIC | Facility: CLINIC | Age: 22
End: 2022-12-30

## 2022-12-30 NOTE — TELEPHONE ENCOUNTER
Caller: Giselle Talavera    Relationship: Self      What medication are you requesting: GENERIC VERSION ( JUNEL) [id number] - [place], [place] - [address] AT [facility] [phone] [facility] [phone]   [phone]      Have you had these symptoms before:    [x] Yes  [] No    Have you been treated for these symptoms before:   [x] Yes  [] No    If a prescription is needed, what is your preferred pharmacy and phone number: [facility] #[id number] - [place], [place] - [address] AT [facility] [phone] [facility] [phone]

## 2023-03-14 DIAGNOSIS — Z30.40 ENCOUNTER FOR SURVEILLANCE OF CONTRACEPTIVES, UNSPECIFIED CONTRACEPTIVE: ICD-10-CM

## 2023-03-14 RX ORDER — NORETHINDRONE ACETATE AND ETHINYL ESTRADIOL .03; 1.5 MG/1; MG/1
1 TABLET ORAL DAILY
Qty: 90 EACH | Refills: 0 | Status: SHIPPED | OUTPATIENT
Start: 2023-03-14 | End: 2023-03-27

## 2023-03-27 ENCOUNTER — OFFICE VISIT (OUTPATIENT)
Dept: FAMILY MEDICINE CLINIC | Facility: CLINIC | Age: 23
End: 2023-03-27
Payer: COMMERCIAL

## 2023-03-27 ENCOUNTER — CLINICAL SUPPORT (OUTPATIENT)
Dept: FAMILY MEDICINE CLINIC | Facility: CLINIC | Age: 23
End: 2023-03-27
Payer: COMMERCIAL

## 2023-03-27 VITALS
BODY MASS INDEX: 30.22 KG/M2 | HEART RATE: 83 BPM | SYSTOLIC BLOOD PRESSURE: 128 MMHG | DIASTOLIC BLOOD PRESSURE: 89 MMHG | WEIGHT: 170.6 LBS | TEMPERATURE: 98.1 F | OXYGEN SATURATION: 96 %

## 2023-03-27 DIAGNOSIS — F41.9 ANXIETY AND DEPRESSION: ICD-10-CM

## 2023-03-27 DIAGNOSIS — Z13.220 LIPID SCREENING: ICD-10-CM

## 2023-03-27 DIAGNOSIS — Z01.89 ENCOUNTER FOR ROUTINE LABORATORY TESTING: ICD-10-CM

## 2023-03-27 DIAGNOSIS — Z11.3 SCREEN FOR STD (SEXUALLY TRANSMITTED DISEASE): ICD-10-CM

## 2023-03-27 DIAGNOSIS — Z13.29 SCREENING FOR THYROID DISORDER: ICD-10-CM

## 2023-03-27 DIAGNOSIS — E03.9 HYPOTHYROIDISM, UNSPECIFIED TYPE: ICD-10-CM

## 2023-03-27 DIAGNOSIS — Z01.419 PAP SMEAR, AS PART OF ROUTINE GYNECOLOGICAL EXAMINATION: Primary | ICD-10-CM

## 2023-03-27 DIAGNOSIS — Z11.3 SCREENING FOR STD (SEXUALLY TRANSMITTED DISEASE): ICD-10-CM

## 2023-03-27 DIAGNOSIS — Z30.011 ENCOUNTER FOR INITIAL PRESCRIPTION OF CONTRACEPTIVE PILLS: ICD-10-CM

## 2023-03-27 DIAGNOSIS — R53.83 FATIGUE, UNSPECIFIED TYPE: ICD-10-CM

## 2023-03-27 DIAGNOSIS — F32.A ANXIETY AND DEPRESSION: ICD-10-CM

## 2023-03-27 DIAGNOSIS — Z11.59 NEED FOR HEPATITIS C SCREENING TEST: ICD-10-CM

## 2023-03-27 LAB
ALBUMIN SERPL-MCNC: 4.8 G/DL (ref 3.5–5.2)
ALBUMIN/GLOB SERPL: 1.8 G/DL
ALP SERPL-CCNC: 70 U/L (ref 39–117)
ALT SERPL W P-5'-P-CCNC: 20 U/L (ref 1–33)
ANION GAP SERPL CALCULATED.3IONS-SCNC: 10.5 MMOL/L (ref 5–15)
AST SERPL-CCNC: 17 U/L (ref 1–32)
BILIRUB SERPL-MCNC: 1.3 MG/DL (ref 0–1.2)
BUN SERPL-MCNC: 14 MG/DL (ref 6–20)
BUN/CREAT SERPL: 16.7 (ref 7–25)
C TRACH RRNA CVX QL NAA+PROBE: NOT DETECTED
CALCIUM SPEC-SCNC: 10 MG/DL (ref 8.6–10.5)
CANDIDA SPECIES: NEGATIVE
CHLORIDE SERPL-SCNC: 102 MMOL/L (ref 98–107)
CHOLEST SERPL-MCNC: 249 MG/DL (ref 0–200)
CO2 SERPL-SCNC: 24.5 MMOL/L (ref 22–29)
CREAT SERPL-MCNC: 0.84 MG/DL (ref 0.57–1)
DEPRECATED RDW RBC AUTO: 39.4 FL (ref 37–54)
EGFRCR SERPLBLD CKD-EPI 2021: 99.7 ML/MIN/1.73
ERYTHROCYTE [DISTWIDTH] IN BLOOD BY AUTOMATED COUNT: 12.2 % (ref 12.3–15.4)
GARDNERELLA VAGINALIS: NEGATIVE
GLOBULIN UR ELPH-MCNC: 2.6 GM/DL
GLUCOSE SERPL-MCNC: 127 MG/DL (ref 65–99)
HCT VFR BLD AUTO: 46.2 % (ref 34–46.6)
HCV AB SER DONR QL: NORMAL
HDLC SERPL-MCNC: 69 MG/DL (ref 40–60)
HGB BLD-MCNC: 15.8 G/DL (ref 12–15.9)
HIV1+2 AB SER QL: NORMAL
LDLC SERPL CALC-MCNC: 164 MG/DL (ref 0–100)
LDLC/HDLC SERPL: 2.34 {RATIO}
MCH RBC QN AUTO: 30.1 PG (ref 26.6–33)
MCHC RBC AUTO-ENTMCNC: 34.2 G/DL (ref 31.5–35.7)
MCV RBC AUTO: 88 FL (ref 79–97)
N GONORRHOEA RRNA SPEC QL NAA+PROBE: NOT DETECTED
PLATELET # BLD AUTO: 288 10*3/MM3 (ref 140–450)
PMV BLD AUTO: 9.1 FL (ref 6–12)
POTASSIUM SERPL-SCNC: 4.4 MMOL/L (ref 3.5–5.2)
PROT SERPL-MCNC: 7.4 G/DL (ref 6–8.5)
RBC # BLD AUTO: 5.25 10*6/MM3 (ref 3.77–5.28)
SODIUM SERPL-SCNC: 137 MMOL/L (ref 136–145)
T VAGINALIS DNA VAG QL PROBE+SIG AMP: NEGATIVE
TRIGL SERPL-MCNC: 93 MG/DL (ref 0–150)
TSH SERPL DL<=0.05 MIU/L-ACNC: 2.21 UIU/ML (ref 0.27–4.2)
VLDLC SERPL-MCNC: 16 MG/DL (ref 5–40)
WBC NRBC COR # BLD: 8.36 10*3/MM3 (ref 3.4–10.8)

## 2023-03-27 PROCEDURE — 87591 N.GONORRHOEAE DNA AMP PROB: CPT | Performed by: NURSE PRACTITIONER

## 2023-03-27 PROCEDURE — 87624 HPV HI-RISK TYP POOLED RSLT: CPT | Performed by: NURSE PRACTITIONER

## 2023-03-27 PROCEDURE — 80050 GENERAL HEALTH PANEL: CPT | Performed by: NURSE PRACTITIONER

## 2023-03-27 PROCEDURE — 86803 HEPATITIS C AB TEST: CPT | Performed by: NURSE PRACTITIONER

## 2023-03-27 PROCEDURE — 87491 CHLMYD TRACH DNA AMP PROBE: CPT | Performed by: NURSE PRACTITIONER

## 2023-03-27 PROCEDURE — 86695 HERPES SIMPLEX TYPE 1 TEST: CPT | Performed by: NURSE PRACTITIONER

## 2023-03-27 PROCEDURE — 86592 SYPHILIS TEST NON-TREP QUAL: CPT | Performed by: NURSE PRACTITIONER

## 2023-03-27 PROCEDURE — 86376 MICROSOMAL ANTIBODY EACH: CPT | Performed by: NURSE PRACTITIONER

## 2023-03-27 PROCEDURE — G0432 EIA HIV-1/HIV-2 SCREEN: HCPCS | Performed by: NURSE PRACTITIONER

## 2023-03-27 PROCEDURE — 87660 TRICHOMONAS VAGIN DIR PROBE: CPT | Performed by: NURSE PRACTITIONER

## 2023-03-27 PROCEDURE — 86800 THYROGLOBULIN ANTIBODY: CPT | Performed by: NURSE PRACTITIONER

## 2023-03-27 PROCEDURE — 80061 LIPID PANEL: CPT | Performed by: NURSE PRACTITIONER

## 2023-03-27 PROCEDURE — 87510 GARDNER VAG DNA DIR PROBE: CPT | Performed by: NURSE PRACTITIONER

## 2023-03-27 PROCEDURE — 86696 HERPES SIMPLEX TYPE 2 TEST: CPT | Performed by: NURSE PRACTITIONER

## 2023-03-27 PROCEDURE — 87480 CANDIDA DNA DIR PROBE: CPT | Performed by: NURSE PRACTITIONER

## 2023-03-27 PROCEDURE — G0123 SCREEN CERV/VAG THIN LAYER: HCPCS | Performed by: NURSE PRACTITIONER

## 2023-03-27 RX ORDER — NORETHINDRONE ACETATE AND ETHINYL ESTRADIOL .03; 1.5 MG/1; MG/1
TABLET ORAL
COMMUNITY
Start: 2022-08-26 | End: 2023-03-27

## 2023-03-27 RX ORDER — LEVONORGESTREL AND ETHINYL ESTRADIOL 0.15-0.03
1 KIT ORAL DAILY
Qty: 90 TABLET | Refills: 1 | Status: SHIPPED | OUTPATIENT
Start: 2023-03-27

## 2023-03-27 RX ORDER — ESCITALOPRAM OXALATE 10 MG/1
10 TABLET ORAL DAILY
Qty: 90 TABLET | Refills: 1 | Status: SHIPPED | OUTPATIENT
Start: 2023-03-27
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
## 2023-03-28 LAB
HSV1 IGG SER IA-ACNC: <0.91 INDEX (ref 0–0.9)
HSV2 IGG SER IA-ACNC: <0.91 INDEX (ref 0–0.9)
RPR SER QL: NORMAL
THYROGLOB AB SERPL-ACNC: <1 IU/ML (ref 0–0.9)
THYROPEROXIDASE AB SERPL-ACNC: 9 IU/ML (ref 0–34)
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
## 2023-04-03 LAB
CYTOLOGIST CVX/VAG CYTO: NORMAL
CYTOLOGY CVX/VAG DOC CYTO: NORMAL
CYTOLOGY CVX/VAG DOC THIN PREP: NORMAL
DX ICD CODE: NORMAL
HIV 1 & 2 AB SER-IMP: NORMAL
HPV I/H RISK 4 DNA CVX QL PROBE+SIG AMP: NEGATIVE
Lab: NORMAL
OTHER STN SPEC: NORMAL
STAT OF ADQ CVX/VAG CYTO-IMP: NORMAL

## 2023-04-24 ENCOUNTER — OFFICE VISIT (OUTPATIENT)
Dept: FAMILY MEDICINE CLINIC | Facility: CLINIC | Age: 23
End: 2023-04-24
Payer: COMMERCIAL

## 2023-04-24 VITALS
TEMPERATURE: 96.9 F | BODY MASS INDEX: 30.54 KG/M2 | DIASTOLIC BLOOD PRESSURE: 81 MMHG | HEART RATE: 66 BPM | SYSTOLIC BLOOD PRESSURE: 121 MMHG | OXYGEN SATURATION: 96 % | WEIGHT: 172.4 LBS

## 2023-04-24 DIAGNOSIS — R53.83 FATIGUE, UNSPECIFIED TYPE: ICD-10-CM

## 2023-04-24 DIAGNOSIS — F32.A ANXIETY AND DEPRESSION: ICD-10-CM

## 2023-04-24 DIAGNOSIS — N92.0 MENORRHAGIA WITH REGULAR CYCLE: ICD-10-CM

## 2023-04-24 DIAGNOSIS — Z00.00 ANNUAL PHYSICAL EXAM: Primary | ICD-10-CM

## 2023-04-24 DIAGNOSIS — E55.9 VITAMIN D DEFICIENCY: ICD-10-CM

## 2023-04-24 DIAGNOSIS — F41.9 ANXIETY AND DEPRESSION: ICD-10-CM

## 2023-04-24 LAB
25(OH)D3 SERPL-MCNC: 32.3 NG/ML (ref 30–100)
BASOPHILS # BLD AUTO: 0.05 10*3/MM3 (ref 0–0.2)
BASOPHILS NFR BLD AUTO: 0.6 % (ref 0–1.5)
DEPRECATED RDW RBC AUTO: 40.2 FL (ref 37–54)
EOSINOPHIL # BLD AUTO: 0.43 10*3/MM3 (ref 0–0.4)
EOSINOPHIL NFR BLD AUTO: 5.3 % (ref 0.3–6.2)
ERYTHROCYTE [DISTWIDTH] IN BLOOD BY AUTOMATED COUNT: 12.3 % (ref 12.3–15.4)
FERRITIN SERPL-MCNC: 44.2 NG/ML (ref 13–150)
FOLATE SERPL-MCNC: >20 NG/ML (ref 4.78–24.2)
HCT VFR BLD AUTO: 44.7 % (ref 34–46.6)
HETEROPH AB SER QL LA: NEGATIVE
HGB BLD-MCNC: 15.2 G/DL (ref 12–15.9)
IMM GRANULOCYTES # BLD AUTO: 0.03 10*3/MM3 (ref 0–0.05)
IMM GRANULOCYTES NFR BLD AUTO: 0.4 % (ref 0–0.5)
IRON 24H UR-MRATE: 156 MCG/DL (ref 37–145)
LYMPHOCYTES # BLD AUTO: 3.71 10*3/MM3 (ref 0.7–3.1)
LYMPHOCYTES NFR BLD AUTO: 45.4 % (ref 19.6–45.3)
MCH RBC QN AUTO: 30.2 PG (ref 26.6–33)
MCHC RBC AUTO-ENTMCNC: 34 G/DL (ref 31.5–35.7)
MCV RBC AUTO: 88.9 FL (ref 79–97)
MONOCYTES # BLD AUTO: 0.49 10*3/MM3 (ref 0.1–0.9)
MONOCYTES NFR BLD AUTO: 6 % (ref 5–12)
NEUTROPHILS NFR BLD AUTO: 3.47 10*3/MM3 (ref 1.7–7)
NEUTROPHILS NFR BLD AUTO: 42.3 % (ref 42.7–76)
NRBC BLD AUTO-RTO: 0 /100 WBC (ref 0–0.2)
PLATELET # BLD AUTO: 306 10*3/MM3 (ref 140–450)
PMV BLD AUTO: 9.4 FL (ref 6–12)
RBC # BLD AUTO: 5.03 10*6/MM3 (ref 3.77–5.28)
RETICS # AUTO: 0.07 10*6/MM3 (ref 0.02–0.13)
RETICS/RBC NFR AUTO: 1.4 % (ref 0.7–1.9)
VIT B12 BLD-MCNC: 522 PG/ML (ref 211–946)
WBC NRBC COR # BLD: 8.18 10*3/MM3 (ref 3.4–10.8)

## 2023-04-24 PROCEDURE — 86664 EPSTEIN-BARR NUCLEAR ANTIGEN: CPT | Performed by: NURSE PRACTITIONER

## 2023-04-24 PROCEDURE — 86308 HETEROPHILE ANTIBODY SCREEN: CPT | Performed by: NURSE PRACTITIONER

## 2023-04-24 PROCEDURE — 86665 EPSTEIN-BARR CAPSID VCA: CPT | Performed by: NURSE PRACTITIONER

## 2023-04-24 PROCEDURE — 82607 VITAMIN B-12: CPT | Performed by: NURSE PRACTITIONER

## 2023-04-24 PROCEDURE — 82728 ASSAY OF FERRITIN: CPT | Performed by: NURSE PRACTITIONER

## 2023-04-24 PROCEDURE — 85025 COMPLETE CBC W/AUTO DIFF WBC: CPT | Performed by: NURSE PRACTITIONER

## 2023-04-24 PROCEDURE — 83540 ASSAY OF IRON: CPT | Performed by: NURSE PRACTITIONER

## 2023-04-24 PROCEDURE — 85045 AUTOMATED RETICULOCYTE COUNT: CPT | Performed by: NURSE PRACTITIONER

## 2023-04-24 PROCEDURE — 82306 VITAMIN D 25 HYDROXY: CPT | Performed by: NURSE PRACTITIONER

## 2023-04-24 PROCEDURE — 82746 ASSAY OF FOLIC ACID SERUM: CPT | Performed by: NURSE PRACTITIONER

## 2023-04-24 RX ORDER — ESCITALOPRAM OXALATE 10 MG/1
10 TABLET ORAL DAILY
Qty: 90 TABLET | Refills: 1 | Status: SHIPPED | OUTPATIENT
Start: 2023-04-24

## 2023-04-24 NOTE — PATIENT INSTRUCTIONS
Generalized Anxiety Disorder, Adult  Generalized anxiety disorder (MERLIN) is a mental health condition. Unlike normal worries, anxiety related to MERLIN is not triggered by a specific event. These worries do not fade or get better with time. MERLIN interferes with relationships, work, and school.  MERLIN symptoms can vary from mild to severe. People with severe MERLIN can have intense waves of anxiety with physical symptoms that are similar to panic attacks.  What are the causes?  The exact cause of MERLIN is not known, but the following are believed to have an impact:  Differences in natural brain chemicals.  Genes passed down from parents to children.  Differences in the way threats are perceived.  Development and stress during childhood.  Personality.  What increases the risk?  The following factors may make you more likely to develop this condition:  Being female.  Having a family history of anxiety disorders.  Being very shy.  Experiencing very stressful life events, such as the death of a loved one.  Having a very stressful family environment.  What are the signs or symptoms?  People with MERLIN often worry excessively about many things in their lives, such as their health and family. Symptoms may also include:  Mental and emotional symptoms:  Worrying excessively about natural disasters.  Fear of being late.  Difficulty concentrating.  Fears that others are judging your performance.  Physical symptoms:  Fatigue.  Headaches, muscle tension, muscle twitches, trembling, or feeling shaky.  Feeling like your heart is pounding or beating very fast.  Feeling out of breath or like you cannot take a deep breath.  Having trouble falling asleep or staying asleep, or experiencing restlessness.  Sweating.  Nausea, diarrhea, or irritable bowel syndrome (IBS).  Behavioral symptoms:  Experiencing erratic moods or irritability.  Avoidance of new situations.  Avoidance of people.  Extreme difficulty making decisions.  How is this diagnosed?  This  condition is diagnosed based on your symptoms and medical history. You will also have a physical exam. Your health care provider may perform tests to rule out other possible causes of your symptoms.  To be diagnosed with MERLIN, a person must have anxiety that:  Is out of his or her control.  Affects several different aspects of his or her life, such as work and relationships.  Causes distress that makes him or her unable to take part in normal activities.  Includes at least three symptoms of MERLIN, such as restlessness, fatigue, trouble concentrating, irritability, muscle tension, or sleep problems.  Before your health care provider can confirm a diagnosis of MERLIN, these symptoms must be present more days than they are not, and they must last for 6 months or longer.  How is this treated?  This condition may be treated with:  Medicine. Antidepressant medicine is usually prescribed for long-term daily control. Anti-anxiety medicines may be added in severe cases, especially when panic attacks occur.  Talk therapy (psychotherapy). Certain types of talk therapy can be helpful in treating MERLIN by providing support, education, and guidance. Options include:  Cognitive behavioral therapy (CBT). People learn coping skills and self-calming techniques to ease their physical symptoms. They learn to identify unrealistic thoughts and behaviors and to replace them with more appropriate thoughts and behaviors.  Acceptance and commitment therapy (ACT). This treatment teaches people how to be mindful as a way to cope with unwanted thoughts and feelings.  Biofeedback. This process trains you to manage your body's response (physiological response) through breathing techniques and relaxation methods. You will work with a therapist while machines are used to monitor your physical symptoms.  Stress management techniques. These include yoga, meditation, and exercise.  A mental health specialist can help determine which treatment is best for you.  Some people see improvement with one type of therapy. However, other people require a combination of therapies.  Follow these instructions at home:  Lifestyle  Maintain a consistent routine and schedule.  Anticipate stressful situations. Create a plan and allow extra time to work with your plan.  Practice stress management or self-calming techniques that you have learned from your therapist or your health care provider.  Exercise regularly and spend time outdoors.  Eat a healthy diet that includes plenty of vegetables, fruits, whole grains, low-fat dairy products, and lean protein.  Do not eat a lot of foods that are high in fat, added sugar, or salt (sodium).  Drink plenty of water.  Avoid alcohol. Alcohol can increase anxiety.  Avoid caffeine and certain over-the-counter cold medicines. These may make you feel worse. Ask your pharmacist which medicines to avoid.  General instructions  Take over-the-counter and prescription medicines only as told by your health care provider.  Understand that you are likely to have setbacks. Accept this and be kind to yourself as you persist to take better care of yourself.  Anticipate stressful situations. Create a plan and allow extra time to work with your plan.  Recognize and accept your accomplishments, even if you  them as small.  Spend time with people who care about you.  Keep all follow-up visits. This is important.  Where to find more information  National [place] of Mental Health: www.nimh.nih.gov  Substance Abuse and Mental Health Services: www.samhsa.gov  Contact a health care provider if:  Your symptoms do not get better.  Your symptoms get worse.  You have signs of depression, such as:  A persistently sad or irritable mood.  Loss of enjoyment in activities that used to bring you chandler.  Change in weight or eating.  Changes in sleeping habits.  Get help right away if:  You have thoughts about hurting yourself or others.  If you ever feel like you may hurt  yourself or others, or have thoughts about taking your own life, get help right away. Go to your nearest emergency department or:  Call your local emergency services (771 in the [place]  Call a suicide crisis helpline, such as the National Suicide Prevention Lifeline at 1-358.519.8497 or 191 in the [place] This is open 24 hours a day in the [place]  Text the Crisis Text Line at 014907 (in the [place]  Summary  Generalized anxiety disorder (MERLIN) is a mental health condition that involves worry that is not triggered by a specific event.  People with MERLIN often worry excessively about many things in their lives, such as their health and family.  MERLIN may cause symptoms such as restlessness, trouble concentrating, sleep problems, frequent sweating, nausea, diarrhea, headaches, and trembling or muscle twitching.  A mental health specialist can help determine which treatment is best for you. Some people see improvement with one type of therapy. However, other people require a combination of therapies.  This information is not intended to replace advice given to you by your health care provider. Make sure you discuss any questions you have with your health care provider.  Document Revised: 07/13/2022 Document Reviewed: 04/10/2022  Elsevier Patient Education © 2022 [employer]

## 2023-04-26 LAB
EBV NA IGG SER IA-ACNC: 110 U/ML (ref 0–17.9)
EBV VCA IGG SER IA-ACNC: 41.2 U/ML (ref 0–17.9)
EBV VCA IGM SER IA-ACNC: <36 U/ML (ref 0–35.9)
SERVICE CMNT-IMP: ABNORMAL

## 2023-08-21 ENCOUNTER — TELEPHONE (OUTPATIENT)
Dept: FAMILY MEDICINE CLINIC | Facility: CLINIC | Age: 23
End: 2023-08-21
Payer: COMMERCIAL

## 2023-08-21 DIAGNOSIS — F32.A ANXIETY AND DEPRESSION: ICD-10-CM

## 2023-08-21 DIAGNOSIS — F41.9 ANXIETY AND DEPRESSION: ICD-10-CM

## 2023-08-21 NOTE — TELEPHONE ENCOUNTER
Caller: Giselle Talavera    Relationship to patient: Self    Best call back number: [phone]     Patient is needing: PATIENT WOULD LIKE TO KNOW IF SHE CAN TAKE MAGNESIUM ALONG WITH HER LEXAPRO TO HELP WITH ANXIETY. PLEASE ADVISE.

## 2023-08-21 NOTE — TELEPHONE ENCOUNTER
Caller: Giselle Talavera    Relationship: Self    Best call back number: [phone]     Requested Prescriptions:   Requested Prescriptions     Pending Prescriptions Disp Refills    escitalopram (Lexapro) 10 MG tablet 90 tablet 1     Sig: Take 1 tablet by mouth Daily.        Pharmacy where request should be sent: [facility] [address] [phone] [facility] [phone]      Last office visit with prescribing clinician: 4/24/2023   Last telemedicine visit with prescribing clinician: Visit date not found   Next office visit with prescribing clinician: 10/23/2023     Does the patient have less than a 3 day supply:  [x] Yes  [] No    Would you like a call back once the refill request has been completed: [] Yes [x] No    If the office needs to give you a call back, can they leave a voicemail: [] Yes [x] No    Yuni Lacey, PCT   08/21/23 15:20 EDT

## 2023-08-22 RX ORDER — ESCITALOPRAM OXALATE 10 MG/1
10 TABLET ORAL DAILY
Qty: 90 TABLET | Refills: 1 | Status: SHIPPED | OUTPATIENT
Start: 2023-08-22

## 2023-10-04 ENCOUNTER — OFFICE VISIT (OUTPATIENT)
Dept: FAMILY MEDICINE CLINIC | Facility: CLINIC | Age: 23
End: 2023-10-04
Payer: COMMERCIAL

## 2023-10-04 VITALS
DIASTOLIC BLOOD PRESSURE: 86 MMHG | BODY MASS INDEX: 34.86 KG/M2 | TEMPERATURE: 98 F | HEART RATE: 70 BPM | WEIGHT: 196.8 LBS | OXYGEN SATURATION: 99 % | SYSTOLIC BLOOD PRESSURE: 125 MMHG

## 2023-10-04 DIAGNOSIS — Z30.017 ENCOUNTER FOR INITIAL PRESCRIPTION OF IMPLANTABLE SUBDERMAL CONTRACEPTIVE: ICD-10-CM

## 2023-10-04 DIAGNOSIS — J30.1 SEASONAL ALLERGIC RHINITIS DUE TO POLLEN: Primary | ICD-10-CM

## 2023-10-04 DIAGNOSIS — Z23 NEED FOR INFLUENZA VACCINATION: ICD-10-CM

## 2023-10-04 DIAGNOSIS — F41.9 ANXIETY AND DEPRESSION: ICD-10-CM

## 2023-10-04 DIAGNOSIS — F32.A ANXIETY AND DEPRESSION: ICD-10-CM

## 2023-10-04 RX ORDER — FLUTICASONE PROPIONATE 50 MCG
2 SPRAY, SUSPENSION (ML) NASAL DAILY
Qty: 16 G | Refills: 5 | Status: SHIPPED | OUTPATIENT
Start: 2023-10-04

## 2023-10-04 RX ORDER — ESCITALOPRAM OXALATE 20 MG/1
20 TABLET ORAL DAILY
Qty: 90 TABLET | Refills: 1 | Status: SHIPPED | OUTPATIENT
Start: 2023-10-04

## 2023-10-04 RX ORDER — NORETHINDRONE ACETATE AND ETHINYL ESTRADIOL 1MG-20(21)
1 KIT ORAL DAILY
Qty: 28 TABLET | Refills: 2 | Status: SHIPPED | OUTPATIENT
Start: 2023-10-04 | End: 2024-01-02

## 2023-10-04 NOTE — PROGRESS NOTES
Chief Complaint  Contraception (Don't like how it is affecting her.)    Subjective          Giselle Talavera is a 22 y.o. female who presents to [facility]    History of Present Illness    OCP - pt reports she has been irritable and having mood swings. Mostly around her menses. Pt still have abd pain and cramping with menses. Pt is sweating more with this ocp. Pt was previously on Loestrin 1.5/30, pt was switched to seasonale d/t previous mood swings and irritability. Pt feels like it is worse on the seasonale.     Pt has previously hospitalization 2 years ago for SI.     Allergies are flaring with her taking zyrtec.     PHQ-2 Total Score: 16   PHQ-9 Total Score: 16        Review of Systems   Constitutional:  Negative for chills, fatigue and fever.   Respiratory:  Negative for cough and shortness of breath.    Cardiovascular:  Negative for chest pain and palpitations.   Gastrointestinal:  Negative for constipation, diarrhea, nausea and vomiting.   Musculoskeletal:  Negative for back pain and neck pain.   Skin:  Negative for rash.   Allergic/Immunologic: Positive for environmental allergies.   Neurological:  Negative for dizziness and headaches.        Medical History: has a past medical history of Anxiety and depression (06/03/2019), Chronic fatigue, unspecified, Hypothyroidism, and Trauma.     Surgical History: has a past surgical history that includes Adenoidectomy and Tonsillectomy.     Family History: family history includes Diabetes in her paternal grandfather; Heart disease in an other family member; Hypertension in her maternal grandfather, paternal grandfather, and another family member.     Social History: reports that she has never smoked. She has never used smokeless tobacco. She reports that she does not drink alcohol.    Allergies: Patient has no known allergies.      Health Maintenance Due   Topic Date Due    HPV VACCINES (1 - 2-dose series) Never done    COVID-19 Vaccine  (3 - 2023-24 season) 09/01/2023            Current Outpatient Medications:     cetirizine (zyrTEC) 10 MG tablet, Take 1 tablet by mouth As Needed (As Needed)., Disp: 90 tablet, Rfl: 2    escitalopram (Lexapro) 20 MG tablet, Take 1 tablet by mouth Daily., Disp: 90 tablet, Rfl: 1    fluticasone (FLONASE) 50 MCG/ACT nasal spray, 2 sprays into the nostril(s) as directed by provider Daily., Disp: 16 g, Rfl: 5    norethindrone-ethinyl estradiol FE (Loestrin Fe 1/20) 1-20 MG-MCG per tablet, Take 1 tablet by mouth Daily for 90 days., Disp: 28 tablet, Rfl: 2      Immunization History   Administered Date(s) Administered    COVID-19 (MODERNA) 1st,2nd,3rd Dose Monovalent 05/12/2021, 06/10/2021    Flu Vaccine Quad PF >36MO 12/17/2018    Flu Vaccine Split Quad 10/18/2019    Fluzone (or Fluarix & Flulaval for VFC) >6mos 10/04/2023    Fluzone Quad >6mos (Multi-dose) 01/13/2021, 10/01/2021    Influenza Quad Vaccine (Inpatient) 01/13/2021    Influenza, Unspecified 01/13/2021    Tdap 12/17/2018, 12/17/2018         Objective       Vitals:    10/04/23 1431   BP: 125/86   Pulse: 70   Temp: 98 °F (36.7 °C)   TempSrc: Temporal   SpO2: 99%   Weight: 89.3 kg (196 lb 12.8 oz)      Body mass index is 34.86 kg/m².   Wt Readings from Last 3 Encounters:   10/04/23 89.3 kg (196 lb 12.8 oz)   04/24/23 78.2 kg (172 lb 6.4 oz)   03/27/23 77.4 kg (170 lb 9.6 oz)      BP Readings from Last 3 Encounters:   10/04/23 125/86   04/24/23 121/81   03/27/23 128/89        BMI is >= 30 and <35. (Class 1 Obesity). The following options were offered after discussion;: exercise counseling/recommendations and nutrition counseling/recommendations       Physical Exam  Vitals reviewed.   Constitutional:       Appearance: Normal appearance. She is well-developed.   HENT:      Head: Normocephalic and atraumatic.   Eyes:      Conjunctiva/sclera: Conjunctivae normal.      Pupils: Pupils are equal, round, and reactive to light.   Cardiovascular:      Rate and Rhythm: Normal  rate and regular rhythm.      Heart sounds: Normal heart sounds. No murmur heard.  Pulmonary:      Effort: Pulmonary effort is normal.      Breath sounds: Normal breath sounds. No wheezing or rhonchi.   Abdominal:      General: Bowel sounds are normal. There is no distension.      Palpations: Abdomen is soft.      Tenderness: There is no abdominal tenderness.   Skin:     General: Skin is warm and dry.   Neurological:      Mental Status: She is alert and oriented to person, place, and time.   Psychiatric:         Mood and Affect: Mood and affect normal.         Behavior: Behavior normal.         Thought Content: Thought content normal.         Judgment: Judgment normal.           Result Review :       Common labs          3/27/2023    08:41 4/24/2023    16:01   Common Labs   Glucose 127     BUN 14     Creatinine 0.84     Sodium 137     Potassium 4.4     Chloride 102     Calcium 10.0     Albumin 4.8     Total Bilirubin 1.3     Alkaline Phosphatase 70     AST (SGOT) 17     ALT (SGPT) 20     WBC 8.36  8.18    Hemoglobin 15.8  15.2    Hematocrit 46.2  44.7    Platelets 288  306    Total Cholesterol 249     Triglycerides 93     HDL Cholesterol 69     LDL Cholesterol  164                        Assessment and Plan        Diagnoses and all orders for this visit:    1. Seasonal allergic rhinitis due to pollen (Primary)  -     fluticasone (FLONASE) 50 MCG/ACT nasal spray; 2 sprays into the nostril(s) as directed by provider Daily.  Dispense: 16 g; Refill: 5    2. Anxiety and depression  -     Ambulatory Referral to Psychiatry  -     escitalopram (Lexapro) 20 MG tablet; Take 1 tablet by mouth Daily.  Dispense: 90 tablet; Refill: 1    3. Anxiety and depression  Comments:  Pt denies plan and will continue with counseling.   Orders:  -     Ambulatory Referral to Psychiatry  -     escitalopram (Lexapro) 20 MG tablet; Take 1 tablet by mouth Daily.  Dispense: 90 tablet; Refill: 1    4. Encounter for initial prescription of  implantable subdermal contraceptive  -     norethindrone-ethinyl estradiol FE (Loestrin Fe 1/20) 1-20 MG-MCG per tablet; Take 1 tablet by mouth Daily for 90 days.  Dispense: 28 tablet; Refill: 2    5. Need for influenza vaccination  -     Fluzone (or Fluarix & Flulaval for VFC) >6mos      Lexapro increased to 20 mg to help with PMDD.   Will change OCP to loestrin 1/20 to help with se.     Follow Up     Return in about 6 weeks (around 11/15/2023) for Next scheduled follow up.    Patient was given instructions and counseling regarding her condition or for health maintenance advice. Please see specific information pulled into the AVS if appropriate.     HEATH Dickinson  Answers submitted by the patient for this visit:  Other (Submitted on 10/3/2023)  Please describe your symptoms.: irritability, severe cramps, excessive sweating, fatigue (all of which i believe are hormonal)  Have you had these symptoms before?: Yes  How long have you been having these symptoms?: 1-2 weeks  Please list any medications you are currently taking for this condition.: levonor / eth estradiol 0.15/0.03 mg  Please describe any probable cause for these symptoms. : birth control  Primary Reason for Visit (Submitted on 10/3/2023)  What is the primary reason for your visit?: Other

## 2023-10-10 NOTE — PROGRESS NOTES
Chief Complaint  Anxiety and Depression    Subjective          Giselle Talavera is a 23 y.o. female who presents to [facility]    History of Present Illness    Anxiety/depression - Patient states that it is doing okay. Able to control her anxiety. Patient states that she is feeling more sluggish. She has made an appointment with psych for January and got genesight testing done with [facility], requested the records be sent here. Historically patient has been on Wellbutrin, Effexor, Abilify, Lamictal, and Buspar. 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     Birth control - Patient is complaining of abdominal pain and mood swings since switching to a new birth control. The symptoms coincide with her cycle.     PHQ-2 Total Score: 2   PHQ-9 Total Score: 2        Review of Systems   Constitutional:  Negative for chills, fatigue and fever.   Respiratory:  Negative for cough and shortness of breath.    Cardiovascular:  Negative for chest pain and palpitations.   Gastrointestinal:  Positive for abdominal pain. Negative for constipation, diarrhea, nausea and vomiting.   Musculoskeletal:  Negative for back pain and neck pain.   Skin:  Negative for rash.   Neurological:  Negative for dizziness and headaches.   Psychiatric/Behavioral:  Positive for sleep disturbance. Negative for self-injury and suicidal ideas. The patient is nervous/anxious.           Medical History: has a past medical history of Allergic, Anxiety and depression (06/03/2019), Chronic fatigue, unspecified, Hypothyroidism, and Trauma.     Surgical History: has a past surgical history that includes Adenoidectomy and Tonsillectomy.     Family History: family history includes Anxiety disorder in her paternal grandmother; Diabetes in her paternal grandfather; Heart disease in an other family member; Hypertension in her maternal grandfather, paternal grandfather, and another family member.     Social History: reports that  "she has never smoked. She has never used smokeless tobacco. She reports that she does not drink alcohol and does not use drugs.    Allergies: Patient has no known allergies.      Health Maintenance Due   Topic Date Due    HPV VACCINES (1 - 2-dose series) Never done    COVID-19 Vaccine (3 - 2023-24 season) 09/01/2023            Current Outpatient Medications:     cetirizine (zyrTEC) 10 MG tablet, Take 1 tablet by mouth As Needed (As Needed)., Disp: 90 tablet, Rfl: 2    escitalopram (Lexapro) 20 MG tablet, Take 1 tablet by mouth Daily., Disp: 90 tablet, Rfl: 1    fluticasone (FLONASE) 50 MCG/ACT nasal spray, 2 sprays into the nostril(s) as directed by provider Daily., Disp: 16 g, Rfl: 5    norethindrone-ethinyl estradiol FE (Loestrin Fe 1/20) 1-20 MG-MCG per tablet, Take 1 tablet by mouth Daily for 90 days., Disp: 28 tablet, Rfl: 2    buPROPion XL (Wellbutrin XL) 150 MG 24 hr tablet, Take 1 tablet by mouth Every Morning., Disp: 30 tablet, Rfl: 1      Immunization History   Administered Date(s) Administered    COVID-19 (MODERNA) 1st,2nd,3rd Dose Monovalent 05/12/2021, 06/10/2021    Flu Vaccine Quad PF >36MO 12/17/2018    Flu Vaccine Split Quad 10/18/2019    Fluzone (or Fluarix & Flulaval for VFC) >6mos 10/04/2023    Fluzone Quad >6mos (Multi-dose) 01/13/2021, 10/01/2021    Influenza Quad Vaccine (Inpatient) 01/13/2021    Influenza, Unspecified 01/13/2021    Tdap 12/17/2018, 12/17/2018         Objective       Vitals:    11/13/23 1114   BP: 121/89   Pulse: 77   Temp: 97.3 °F (36.3 °C)   SpO2: 98%   Weight: 90.3 kg (199 lb)   Height: 160 cm (63\")      Body mass index is 35.25 kg/m².   Wt Readings from Last 3 Encounters:   11/13/23 90.3 kg (199 lb)   10/04/23 89.3 kg (196 lb 12.8 oz)   04/24/23 78.2 kg (172 lb 6.4 oz)      BP Readings from Last 3 Encounters:   11/13/23 121/89   10/04/23 125/86   04/24/23 121/81                Physical Exam  Vitals reviewed.   Constitutional:       Appearance: Normal appearance. She is " well-developed.   HENT:      Head: Normocephalic and atraumatic.   Eyes:      Conjunctiva/sclera: Conjunctivae normal.      Pupils: Pupils are equal, round, and reactive to light.   Cardiovascular:      Rate and Rhythm: Normal rate and regular rhythm.      Heart sounds: Normal heart sounds. No murmur heard.  Pulmonary:      Effort: Pulmonary effort is normal.      Breath sounds: Normal breath sounds. No wheezing or rhonchi.   Abdominal:      General: Bowel sounds are normal. There is no distension.      Palpations: Abdomen is soft.      Tenderness: There is no abdominal tenderness.   Skin:     General: Skin is warm and dry.   Neurological:      Mental Status: She is alert and oriented to person, place, and time.   Psychiatric:         Mood and Affect: Mood and affect normal.         Behavior: Behavior normal.         Thought Content: Thought content normal.         Judgment: Judgment normal.             Result Review :       Common labs          3/27/2023    08:41 4/24/2023    16:01   Common Labs   Glucose 127     BUN 14     Creatinine 0.84     Sodium 137     Potassium 4.4     Chloride 102     Calcium 10.0     Albumin 4.8     Total Bilirubin 1.3     Alkaline Phosphatase 70     AST (SGOT) 17     ALT (SGPT) 20     WBC 8.36  8.18    Hemoglobin 15.8  15.2    Hematocrit 46.2  44.7    Platelets 288  306    Total Cholesterol 249     Triglycerides 93     HDL Cholesterol 69     LDL Cholesterol  164                        Assessment and Plan        Diagnoses and all orders for this visit:    1. Anxiety and depression (Primary)  -     buPROPion XL (Wellbutrin XL) 150 MG 24 hr tablet; Take 1 tablet by mouth Every Morning.  Dispense: 30 tablet; Refill: 1    2. Encounter for surveillance of contraceptive pills  Assessment & Plan:  Continue ocp. Will follow up in 2 months after 3 months on ocp to evaluate improvement of menses.       Continue lexapro, added Wellbutrin to see if fatigue improvement with anxiety and depression.        Follow Up     Return in about 6 weeks (around 12/25/2023).    Patient was given instructions and counseling regarding her condition or for health maintenance advice. Please see specific information pulled into the AVS if appropriate.     HEATH Dickinson

## 2023-11-13 ENCOUNTER — OFFICE VISIT (OUTPATIENT)
Dept: FAMILY MEDICINE CLINIC | Facility: CLINIC | Age: 23
End: 2023-11-13
Payer: COMMERCIAL

## 2023-11-13 VITALS
BODY MASS INDEX: 35.26 KG/M2 | DIASTOLIC BLOOD PRESSURE: 89 MMHG | SYSTOLIC BLOOD PRESSURE: 121 MMHG | TEMPERATURE: 97.3 F | WEIGHT: 199 LBS | OXYGEN SATURATION: 98 % | HEIGHT: 63 IN | HEART RATE: 77 BPM

## 2023-11-13 DIAGNOSIS — F41.9 ANXIETY AND DEPRESSION: Primary | ICD-10-CM

## 2023-11-13 DIAGNOSIS — Z30.41 ENCOUNTER FOR SURVEILLANCE OF CONTRACEPTIVE PILLS: ICD-10-CM

## 2023-11-13 DIAGNOSIS — F32.A ANXIETY AND DEPRESSION: Primary | ICD-10-CM

## 2023-11-13 PROCEDURE — 99213 OFFICE O/P EST LOW 20 MIN: CPT | Performed by: NURSE PRACTITIONER

## 2023-11-13 RX ORDER — BUPROPION HYDROCHLORIDE 150 MG/1
150 TABLET ORAL EVERY MORNING
Qty: 30 TABLET | Refills: 1 | Status: SHIPPED | OUTPATIENT
Start: 2023-11-13

## 2023-11-13 NOTE — ASSESSMENT & PLAN NOTE
Continue ocp. Will follow up in 2 months after 3 months on ocp to evaluate improvement of menses.

## 2023-11-13 NOTE — PATIENT INSTRUCTIONS
Generalized Anxiety Disorder, Adult  Generalized anxiety disorder (MERLIN) is a mental health condition. Unlike normal worries, anxiety related to MERLIN is not triggered by a specific event. These worries do not fade or get better with time. MERLIN interferes with relationships, work, and school.  MERLIN symptoms can vary from mild to severe. People with severe MERLIN can have intense waves of anxiety with physical symptoms that are similar to panic attacks.  What are the causes?  The exact cause of MERLIN is not known, but the following are believed to have an impact:  Differences in natural brain chemicals.  Genes passed down from parents to children.  Differences in the way threats are perceived.  Development and stress during childhood.  Personality.  What increases the risk?  The following factors may make you more likely to develop this condition:  Being female.  Having a family history of anxiety disorders.  Being very shy.  Experiencing very stressful life events, such as the death of a loved one.  Having a very stressful family environment.  What are the signs or symptoms?  People with MERLIN often worry excessively about many things in their lives, such as their health and family. Symptoms may also include:  Mental and emotional symptoms:  Worrying excessively about natural disasters.  Fear of being late.  Difficulty concentrating.  Fears that others are judging your performance.  Physical symptoms:  Fatigue.  Headaches, muscle tension, muscle twitches, trembling, or feeling shaky.  Feeling like your heart is pounding or beating very fast.  Feeling out of breath or like you cannot take a deep breath.  Having trouble falling asleep or staying asleep, or experiencing restlessness.  Sweating.  Nausea, diarrhea, or irritable bowel syndrome (IBS).  Behavioral symptoms:  Experiencing erratic moods or irritability.  Avoidance of new situations.  Avoidance of people.  Extreme difficulty making decisions.  How is this diagnosed?  This  condition is diagnosed based on your symptoms and medical history. You will also have a physical exam. Your health care provider may perform tests to rule out other possible causes of your symptoms.  To be diagnosed with MERLIN, a person must have anxiety that:  Is out of his or her control.  Affects several different aspects of his or her life, such as work and relationships.  Causes distress that makes him or her unable to take part in normal activities.  Includes at least three symptoms of MERLIN, such as restlessness, fatigue, trouble concentrating, irritability, muscle tension, or sleep problems.  Before your health care provider can confirm a diagnosis of MERLIN, these symptoms must be present more days than they are not, and they must last for 6 months or longer.  How is this treated?  This condition may be treated with:  Medicine. Antidepressant medicine is usually prescribed for long-term daily control. Anti-anxiety medicines may be added in severe cases, especially when panic attacks occur.  Talk therapy (psychotherapy). Certain types of talk therapy can be helpful in treating MERLIN by providing support, education, and guidance. Options include:  Cognitive behavioral therapy (CBT). People learn coping skills and self-calming techniques to ease their physical symptoms. They learn to identify unrealistic thoughts and behaviors and to replace them with more appropriate thoughts and behaviors.  Acceptance and commitment therapy (ACT). This treatment teaches people how to be mindful as a way to cope with unwanted thoughts and feelings.  Biofeedback. This process trains you to manage your body's response (physiological response) through breathing techniques and relaxation methods. You will work with a therapist while machines are used to monitor your physical symptoms.  Stress management techniques. These include yoga, meditation, and exercise.  A mental health specialist can help determine which treatment is best for you.  Some people see improvement with one type of therapy. However, other people require a combination of therapies.  Follow these instructions at home:  Lifestyle  Maintain a consistent routine and schedule.  Anticipate stressful situations. Create a plan and allow extra time to work with your plan.  Practice stress management or self-calming techniques that you have learned from your therapist or your health care provider.  Exercise regularly and spend time outdoors.  Eat a healthy diet that includes plenty of vegetables, fruits, whole grains, low-fat dairy products, and lean protein.  Do not eat a lot of foods that are high in fat, added sugar, or salt (sodium).  Drink plenty of water.  Avoid alcohol. Alcohol can increase anxiety.  Avoid caffeine and certain over-the-counter cold medicines. These may make you feel worse. Ask your pharmacist which medicines to avoid.  General instructions  Take over-the-counter and prescription medicines only as told by your health care provider.  Understand that you are likely to have setbacks. Accept this and be kind to yourself as you persist to take better care of yourself.  Anticipate stressful situations. Create a plan and allow extra time to work with your plan.  Recognize and accept your accomplishments, even if you  them as small.  Spend time with people who care about you.  Keep all follow-up visits. This is important.  Where to find more information  [place] of Mental Health: www.nimh.nih.gov  Substance Abuse and Mental Health Services: www.samhsa.gov  Contact a health care provider if:  Your symptoms do not get better.  Your symptoms get worse.  You have signs of depression, such as:  A persistently sad or irritable mood.  Loss of enjoyment in activities that used to bring you chandler.  Change in weight or eating.  Changes in sleeping habits.  Get help right away if:  You have thoughts about hurting yourself or others.  If you ever feel like you may hurt  yourself or others, or have thoughts about taking your own life, get help right away. Go to your nearest emergency department or:  Call your local emergency services (321 in the [place]  Call a suicide crisis helpline, such as the National Suicide Prevention Lifeline at 1-647.693.4596 or 994 in the [place] This is open 24 hours a day in the [place]  Text the Crisis Text Line at 195568 (in the [place]  Summary  Generalized anxiety disorder (MERLIN) is a mental health condition that involves worry that is not triggered by a specific event.  People with MERLIN often worry excessively about many things in their lives, such as their health and family.  MERLIN may cause symptoms such as restlessness, trouble concentrating, sleep problems, frequent sweating, nausea, diarrhea, headaches, and trembling or muscle twitching.  A mental health specialist can help determine which treatment is best for you. Some people see improvement with one type of therapy. However, other people require a combination of therapies.  This information is not intended to replace advice given to you by your health care provider. Make sure you discuss any questions you have with your health care provider.  Document Revised: 07/13/2022 Document Reviewed: 04/10/2022  Elsevier Patient Education © 2023 [employer]

## 2023-11-20 RX ORDER — CETIRIZINE HYDROCHLORIDE 10 MG/1
10 TABLET ORAL AS NEEDED
Qty: 90 TABLET | Refills: 2 | Status: SHIPPED | OUTPATIENT
Start: 2023-11-20

## 2023-11-21 NOTE — PROGRESS NOTES
"Chief Complaint  Anxiety (Follow up)    Subjective          Giselle Talavera is a 23 y.o. female who presents to [facility]    History of Present Illness    Anxiety/depression - pt reports she is compliant with Wellbutrin and lexapro. Pt denies any med se with Wellbutrin. Pt is sleeping well. We did not get pt genesight testing. Historically patient has been on Wellbutrin, Effexor, Abilify, Lamictal, and Buspar. Pt is controlling anxiety. Pt states she was sick last week so she is unsure how much the brain fog has improved.     Menorrhagia - LMP: 11.26.2023, 3 days of painful cramping. Denies heavy flow. Pt is not taking preventative meds prior to menses.     PHQ-2 Total Score: 9   PHQ-9 Total Score: 9   Increased from previously. Pt reports it \"gets dark early\". She also has been sick the last two weeks.        Review of Systems   Constitutional:  Negative for chills, fatigue and fever.   Respiratory:  Negative for cough and shortness of breath.    Cardiovascular:  Negative for chest pain and palpitations.   Gastrointestinal:  Negative for constipation, diarrhea, nausea and vomiting.   Genitourinary:  Positive for menstrual problem.   Musculoskeletal:  Negative for back pain and neck pain.   Skin:  Negative for rash.   Neurological:  Negative for dizziness and headaches.   Psychiatric/Behavioral:  Positive for dysphoric mood. Negative for self-injury, sleep disturbance and suicidal ideas. The patient is nervous/anxious.           Medical History: has a past medical history of Allergic, Anxiety and depression (06/03/2019), Chronic fatigue, unspecified, Hypothyroidism, and Trauma.     Surgical History: has a past surgical history that includes Adenoidectomy and Tonsillectomy.     Family History: family history includes Anxiety disorder in her paternal grandmother; Diabetes in her paternal grandfather; Heart disease in an other family member; Hypertension in her maternal grandfather, " "paternal grandfather, and another family member.     Social History: reports that she has never smoked. She has never used smokeless tobacco. She reports that she does not drink alcohol and does not use drugs.    Allergies: Patient has no known allergies.      Health Maintenance Due   Topic Date Due    HPV VACCINES (1 - 2-dose series) Never done    COVID-19 Vaccine (3 - 2023-24 season) 09/01/2023            Current Outpatient Medications:     buPROPion XL (Wellbutrin XL) 150 MG 24 hr tablet, Take 1 tablet by mouth Every Morning., Disp: 30 tablet, Rfl: 1    cetirizine (zyrTEC) 10 MG tablet, Take 1 tablet by mouth As Needed (As Needed)., Disp: 90 tablet, Rfl: 2    escitalopram (Lexapro) 20 MG tablet, Take 1 tablet by mouth Daily., Disp: 90 tablet, Rfl: 1    fluticasone (FLONASE) 50 MCG/ACT nasal spray, 2 sprays into the nostril(s) as directed by provider Daily., Disp: 16 g, Rfl: 5    norgestrel-ethinyl estradiol (LOW-OGESTREL,CRYSELLE) 0.3-30 MG-MCG per tablet, Take 1 tablet by mouth Daily., Disp: 28 tablet, Rfl: 2      Immunization History   Administered Date(s) Administered    COVID-19 (MODERNA) 1st,2nd,3rd Dose Monovalent 05/12/2021, 06/10/2021    Flu Vaccine Quad PF >36MO 12/17/2018    Flu Vaccine Split Quad 10/18/2019    Fluzone (or Fluarix & Flulaval for VFC) >6mos 10/04/2023    Fluzone Quad >6mos (Multi-dose) 01/13/2021, 10/01/2021    Influenza Quad Vaccine (Inpatient) 01/13/2021    Influenza, Unspecified 01/13/2021    Tdap 12/17/2018, 12/17/2018         Objective       Vitals:    12/18/23 1519   BP: 121/84   Pulse: 68   Temp: 98.6 °F (37 °C)   TempSrc: Temporal   SpO2: 98%   Weight: 90.3 kg (199 lb)   Height: 160 cm (62.99\")      Body mass index is 35.26 kg/m².   Wt Readings from Last 3 Encounters:   12/18/23 90.3 kg (199 lb)   11/13/23 90.3 kg (199 lb)   10/04/23 89.3 kg (196 lb 12.8 oz)      BP Readings from Last 3 Encounters:   12/18/23 121/84   11/13/23 121/89   10/04/23 125/86                Physical " Exam  Vitals reviewed.   Constitutional:       Appearance: Normal appearance. She is well-developed.   HENT:      Head: Normocephalic and atraumatic.   Eyes:      Conjunctiva/sclera: Conjunctivae normal.      Pupils: Pupils are equal, round, and reactive to light.   Cardiovascular:      Rate and Rhythm: Normal rate and regular rhythm.      Heart sounds: Normal heart sounds. No murmur heard.  Pulmonary:      Effort: Pulmonary effort is normal.      Breath sounds: Normal breath sounds. No wheezing or rhonchi.   Abdominal:      General: Bowel sounds are normal. There is no distension.      Palpations: Abdomen is soft.      Tenderness: There is no abdominal tenderness.   Skin:     General: Skin is warm and dry.   Neurological:      Mental Status: She is alert and oriented to person, place, and time.   Psychiatric:         Mood and Affect: Mood and affect normal.         Behavior: Behavior normal.         Thought Content: Thought content normal.         Judgment: Judgment normal.             Result Review :       Common labs          3/27/2023    08:41 4/24/2023    16:01   Common Labs   Glucose 127     BUN 14     Creatinine 0.84     Sodium 137     Potassium 4.4     Chloride 102     Calcium 10.0     Albumin 4.8     Total Bilirubin 1.3     Alkaline Phosphatase 70     AST (SGOT) 17     ALT (SGPT) 20     WBC 8.36  8.18    Hemoglobin 15.8  15.2    Hematocrit 46.2  44.7    Platelets 288  306    Total Cholesterol 249     Triglycerides 93     HDL Cholesterol 69     LDL Cholesterol  164                        Assessment and Plan        Diagnoses and all orders for this visit:    1. Anxiety and depression (Primary)  Comments:  will try wellbutrin 300 mg, obtain genesight and she will keep psychiatry appt in January.    2. Encounter for surveillance of contraceptive pills  Comments:  start when new pill pack is due.  Orders:  -     norgestrel-ethinyl estradiol (LOW-OGESTREL,CRYSELLE) 0.3-30 MG-MCG per tablet; Take 1 tablet by mouth  Daily.  Dispense: 28 tablet; Refill: 2    Birth control pills need to be taken at the same time daily.  Vomiting, diarrhea, antibiotics and seizure medication make these pills less effective.  If any of these happen during a pack use another form of birth control until you start the new pack.  Breakthrough bleeding is any bleeding during the active pills in the pack.  If this occurs use another form of birth control.  If you miss a pill or take one late use another form of birth control until you start a new pack.  Abdominal pain, chest pain, headaches (not relieved by Tylenol), leg pain or vision changes need to be reported immediately to your provider.    Please avoid tobacco use. Pt is non smoker.      Discussed sexually transmitted diseases and how to protect herself from getting an STD.        Follow Up     Return in about 3 months (around 3/18/2024) for Next scheduled follow up.    Patient was given instructions and counseling regarding her condition or for health maintenance advice. Please see specific information pulled into the AVS if appropriate.     HEATH Dickinson

## 2023-12-18 ENCOUNTER — OFFICE VISIT (OUTPATIENT)
Dept: FAMILY MEDICINE CLINIC | Facility: CLINIC | Age: 23
End: 2023-12-18
Payer: COMMERCIAL

## 2023-12-18 VITALS
DIASTOLIC BLOOD PRESSURE: 84 MMHG | TEMPERATURE: 98.6 F | WEIGHT: 199 LBS | HEIGHT: 63 IN | SYSTOLIC BLOOD PRESSURE: 121 MMHG | OXYGEN SATURATION: 98 % | HEART RATE: 68 BPM | BODY MASS INDEX: 35.26 KG/M2

## 2023-12-18 DIAGNOSIS — Z30.41 ENCOUNTER FOR SURVEILLANCE OF CONTRACEPTIVE PILLS: ICD-10-CM

## 2023-12-18 DIAGNOSIS — F32.A ANXIETY AND DEPRESSION: Primary | ICD-10-CM

## 2023-12-18 DIAGNOSIS — F41.9 ANXIETY AND DEPRESSION: Primary | ICD-10-CM

## 2023-12-18 PROCEDURE — 99213 OFFICE O/P EST LOW 20 MIN: CPT | Performed by: NURSE PRACTITIONER

## 2024-01-01 DIAGNOSIS — F41.9 ANXIETY AND DEPRESSION: ICD-10-CM

## 2024-01-01 DIAGNOSIS — F32.A ANXIETY AND DEPRESSION: ICD-10-CM

## 2024-01-02 RX ORDER — BUPROPION HYDROCHLORIDE 150 MG/1
150 TABLET ORAL EVERY MORNING
Qty: 30 TABLET | Refills: 1 | Status: SHIPPED | OUTPATIENT
Start: 2024-01-02 | End: 2024-01-03 | Stop reason: SDUPTHER

## 2024-01-03 ENCOUNTER — PATIENT MESSAGE (OUTPATIENT)
Dept: FAMILY MEDICINE CLINIC | Facility: CLINIC | Age: 24
End: 2024-01-03
Payer: COMMERCIAL

## 2024-01-03 DIAGNOSIS — F32.A ANXIETY AND DEPRESSION: ICD-10-CM

## 2024-01-03 DIAGNOSIS — F41.9 ANXIETY AND DEPRESSION: ICD-10-CM

## 2024-01-03 RX ORDER — BUPROPION HYDROCHLORIDE 150 MG/1
300 TABLET ORAL EVERY MORNING
Qty: 60 TABLET | Refills: 2 | Status: SHIPPED | OUTPATIENT
Start: 2024-01-03 | End: 2024-01-03 | Stop reason: SDUPTHER

## 2024-01-04 RX ORDER — BUPROPION HYDROCHLORIDE 150 MG/1
300 TABLET ORAL EVERY MORNING
Qty: 60 TABLET | Refills: 2 | Status: SHIPPED | OUTPATIENT
Start: 2024-01-04
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
## 2024-01-08 ENCOUNTER — OFFICE VISIT (OUTPATIENT)
Dept: PSYCHIATRY | Facility: CLINIC | Age: 24
End: 2024-01-08
Payer: COMMERCIAL

## 2024-01-08 VITALS — HEIGHT: 63 IN | WEIGHT: 203 LBS | BODY MASS INDEX: 35.97 KG/M2

## 2024-01-08 DIAGNOSIS — F41.1 GAD (GENERALIZED ANXIETY DISORDER): ICD-10-CM

## 2024-01-08 DIAGNOSIS — F33.1 MAJOR DEPRESSIVE DISORDER, RECURRENT EPISODE, MODERATE: Primary | ICD-10-CM

## 2024-01-08 PROBLEM — F32.A ANXIETY AND DEPRESSION: Status: RESOLVED | Noted: 2019-06-03 | Resolved: 2024-01-08

## 2024-01-08 PROBLEM — F41.9 ANXIETY AND DEPRESSION: Status: RESOLVED | Noted: 2019-06-03 | Resolved: 2024-01-08

## 2024-01-08 PROCEDURE — 90792 PSYCH DIAG EVAL W/MED SRVCS: CPT | Performed by: PSYCHIATRY & NEUROLOGY

## 2024-01-08 RX ORDER — ESCITALOPRAM OXALATE 5 MG/1
TABLET ORAL
Qty: 60 TABLET | Refills: 0 | Status: SHIPPED | OUTPATIENT
Start: 2024-01-08

## 2024-01-08 RX ORDER — DESVENLAFAXINE 25 MG/1
25 TABLET, EXTENDED RELEASE ORAL DAILY
Qty: 30 TABLET | Refills: 1 | Status: SHIPPED | OUTPATIENT
Start: 2024-01-08

## 2024-01-08 NOTE — TREATMENT PLAN
Multi-Disciplinary Problems (from Behavioral Health Treatment Plan)      Active Problems       Problem: Anxiety  Start Date: 01/08/24      Problem Details: The patient self-scales this problem as a 5 with 10 being the worst.          Goal Priority Start Date Expected End Date End Date    Patient will develop and implement behavioral and cognitive strategies to reduce anxiety and irrational fears. -- 01/08/24 -- --    Goal Details: Progress toward goal:  Not appropriate to rate progress toward goal since this is the initial treatment plan.          Goal Intervention Frequency Start Date End Date    Help patient explore past emotional issues in relation to present anxiety. PRN 01/08/24 --    Intervention Details: Duration of treatment until until remission of symptoms.          Goal Intervention Frequency Start Date End Date    Help patient develop an awareness of their cognitive and physical responses to anxiety. PRN 01/08/24 --    Intervention Details: Duration of treatment until until remission of symptoms.                  Problem: Depression  Start Date: 01/08/24      Problem Details: The patient self-scales this problem as a 4 with 10 being the worst.          Goal Priority Start Date Expected End Date End Date    Patient will demonstrate the ability to initiate new constructive life skills outside of sessions on a consistent basis. -- 01/08/24 -- --    Goal Details: Progress toward goal:  Not appropriate to rate progress toward goal since this is the initial treatment plan.          Goal Intervention Frequency Start Date End Date    Assist patient in setting attainable activities of daily living goals. PRN 01/08/24 --      Goal Intervention Frequency Start Date End Date    Provide education about depression PRN 01/08/24 --    Intervention Details: Duration of treatment until until remission of symptoms.          Goal Intervention Frequency Start Date End Date    Assist patient in developing healthy coping  strategies. PRN 01/08/24 --    Intervention Details: Duration of treatment until until remission of symptoms.                                 I have discussed and reviewed this treatment plan with the patient.

## 2024-01-12 ENCOUNTER — PATIENT ROUNDING (BHMG ONLY) (OUTPATIENT)
Dept: PSYCHIATRY | Facility: CLINIC | Age: 24
End: 2024-01-12
Payer: COMMERCIAL

## 2024-01-12 NOTE — PROGRESS NOTES
January 12, 2024    Hello, may I speak with Giselle Talavera?    My name is       I am  with [facility]  [facility]  [address]  [place] [address] [phone].    Before we get started may I verify your date of birth? 2000    I am calling to officially welcome you to our practice and ask about your recent visit. Is this a good time to talk? No  left voicemail    Tell me about your visit with us. What things went well?         We're always looking for ways to make our patients' experiences even better. Do you have recommendations on ways we may improve?      Overall were you satisfied with your first visit to our practice?        I appreciate you taking the time to speak with me today. Is there anything else I can do for you?       Thank you, and have a great day.

## 2024-02-19 ENCOUNTER — OFFICE VISIT (OUTPATIENT)
Dept: PSYCHIATRY | Facility: CLINIC | Age: 24
End: 2024-02-19
Payer: COMMERCIAL

## 2024-02-19 VITALS
WEIGHT: 204.8 LBS | OXYGEN SATURATION: 97 % | DIASTOLIC BLOOD PRESSURE: 92 MMHG | SYSTOLIC BLOOD PRESSURE: 135 MMHG | HEART RATE: 126 BPM | BODY MASS INDEX: 37.69 KG/M2 | HEIGHT: 62 IN

## 2024-02-19 DIAGNOSIS — F41.1 GAD (GENERALIZED ANXIETY DISORDER): ICD-10-CM

## 2024-02-19 DIAGNOSIS — F32.81 PMDD (PREMENSTRUAL DYSPHORIC DISORDER): ICD-10-CM

## 2024-02-19 DIAGNOSIS — F33.1 MAJOR DEPRESSIVE DISORDER, RECURRENT EPISODE, MODERATE: Primary | ICD-10-CM

## 2024-02-19 PROCEDURE — 99214 OFFICE O/P EST MOD 30 MIN: CPT | Performed by: PSYCHIATRY & NEUROLOGY

## 2024-02-19 PROCEDURE — 90833 PSYTX W PT W E/M 30 MIN: CPT | Performed by: PSYCHIATRY & NEUROLOGY
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
## 2024-03-13 NOTE — PROGRESS NOTES
Chief Complaint  Anxiety (Follow up). Ocp managment    Subjective          Giselle Talavera is a 23 y.o. female who presents to [facility]    History of Present Illness  History of Present Illness  The patient presents for a 3-month follow-up.    Elevated diastolic b/p - patient consumed a significant amount of salt the previous day.    The patient is currently under the care of a psychiatrist and is on a regimen of Wellbutrin and Trintellix, which she reports as beneficial. Despite experiencing irritability during her menstrual cycle, she finds Trintellix to be beneficial for the remainder of the day. She expresses a sensation of  herself monthly, often unaware of her menstrual cycle. Her psychiatrist has not recommended any specific interventions. She has previously tried BuSpar, but it proved insufficient in managing her anxiety.    The patient's visit today is for her birth control visit. She has never used birth control, which she believes has helped to regulate her menstrual cycle. The initial days of her cycle are heavy, but are otherwise regular. She does not experience bleeding through clothes, miss appointments, or work. Her last menstrual period was on 03/15/2024, and she anticipates another menstrual period at the end of this week.    The patient does not experience abdominal pain when standing, but experiences intense abdominal pain when attempting to sleep. Her stomach feels distended and she is unable to suck it in. This occurs a few times a month, lasting 12 to 14 hours. She reports feeling full after eating and sometimes has eaten before bed and sometimes has not. She denies any correlation between constipation and diarrhea. She denies any blood in her stool. She experiences reflux and bloating a few times a month. She has not identified any specific foods that trigger these symptoms. The most recent episode was approximately a month ago. Burping, flatulence, and a  heating pad sometimes alleviate her symptoms.             Review of Systems   Constitutional:  Negative for fever.   Gastrointestinal:  Positive for abdominal pain. Negative for blood in stool, constipation, diarrhea, nausea and vomiting.   Genitourinary:  Negative for dysuria, frequency and hematuria.   Musculoskeletal:  Negative for arthralgias and myalgias.          Medical History: has a past medical history of Allergic, Anxiety and depression (06/03/2019), Chronic fatigue, unspecified, Hypothyroidism, and Trauma.     Surgical History: has a past surgical history that includes Adenoidectomy and Tonsillectomy.     Family History: family history includes Anxiety disorder in her paternal grandmother; Diabetes in her paternal grandfather; Heart disease in an other family member; Hypertension in her maternal grandfather, paternal grandfather, and another family member; No Known Problems in her brother, cousin, father, maternal aunt, maternal grandmother, maternal uncle, mother, paternal aunt, paternal uncle, and sister.     Social History: reports that she has never smoked. She has been exposed to tobacco smoke. She has never used smokeless tobacco. She reports that she does not drink alcohol and does not use drugs.    Allergies: Patient has no known allergies.      Health Maintenance Due   Topic Date Due    HPV VACCINES (1 - 3-dose series) Never done    COVID-19 Vaccine (3 - 2023-24 season) 09/01/2023            Current Outpatient Medications:     buPROPion XL (Wellbutrin XL) 150 MG 24 hr tablet, Take 2 tablets by mouth Every Morning., Disp: 60 tablet, Rfl: 2    cetirizine (zyrTEC) 10 MG tablet, Take 1 tablet by mouth As Needed (As Needed)., Disp: 90 tablet, Rfl: 2    fluticasone (FLONASE) 50 MCG/ACT nasal spray, 2 sprays into the nostril(s) as directed by provider Daily., Disp: 16 g, Rfl: 5    Vortioxetine HBr (TRINTELLIX) 10 MG tablet tablet, Take 1 tablet by mouth Daily With Breakfast., Disp: 30 tablet, Rfl: 1     "busPIRone (BUSPAR) 5 MG tablet, Take 1 tablet by mouth 3 (Three) Times a Day As Needed (anxiety)., Disp: 30 tablet, Rfl: 1    famotidine (Pepcid) 40 MG tablet, Take 1 tablet by mouth Daily., Disp: 30 tablet, Rfl: 2    Levonorgest-Eth Estrad 91-Day 0.15-0.03 &0.01 MG, Take 1 tablet by mouth Daily., Disp: 90 tablet, Rfl: 1      Immunization History   Administered Date(s) Administered    COVID-19 (MODERNA) 1st,2nd,3rd Dose Monovalent 05/12/2021, 06/10/2021    Flu Vaccine Quad PF >36MO 12/17/2018    Flu Vaccine Split Quad 10/18/2019    Fluzone (or Fluarix & Flulaval for VFC) >6mos 10/07/2021, 10/04/2023    Fluzone Quad >6mos (Multi-dose) 01/13/2021, 10/01/2021    Influenza Quad Vaccine (Inpatient) 01/13/2021    Influenza, Unspecified 01/13/2021, 10/07/2021    Tdap 12/17/2018, 12/17/2018         Objective       Vitals:    04/08/24 1052   BP: 123/91   Pulse: 68   Temp: 97.8 °F (36.6 °C)   TempSrc: Temporal   SpO2: 98%   Weight: 91.2 kg (201 lb)   Height: 157.5 cm (62.01\")      Body mass index is 36.75 kg/m².   Wt Readings from Last 3 Encounters:   04/08/24 91.2 kg (201 lb)   02/19/24 92.9 kg (204 lb 12.8 oz)   01/08/24 92.1 kg (203 lb)      BP Readings from Last 3 Encounters:   04/08/24 123/91   02/19/24 135/92   12/18/23 121/84             Physical Exam  Vitals reviewed.   Constitutional:       Appearance: Normal appearance. She is well-developed.   HENT:      Head: Normocephalic and atraumatic.   Eyes:      Conjunctiva/sclera: Conjunctivae normal.      Pupils: Pupils are equal, round, and reactive to light.   Cardiovascular:      Rate and Rhythm: Normal rate and regular rhythm.      Heart sounds: Normal heart sounds. No murmur heard.  Pulmonary:      Effort: Pulmonary effort is normal.      Breath sounds: Normal breath sounds. No wheezing or rhonchi.   Abdominal:      General: Bowel sounds are normal. There is no distension.      Palpations: Abdomen is soft.      Tenderness: There is no abdominal tenderness.   Skin:     " General: Skin is warm and dry.   Neurological:      Mental Status: She is alert and oriented to person, place, and time.   Psychiatric:         Mood and Affect: Mood and affect normal.         Behavior: Behavior normal.         Thought Content: Thought content normal.         Judgment: Judgment normal.             Result Review :   Results         Common labs          4/24/2023    16:01   Common Labs   WBC 8.18    Hemoglobin 15.2    Hematocrit 44.7    Platelets 306                     Assessment and Plan        Diagnoses and all orders for this visit:    1. Gastroesophageal reflux disease without esophagitis (Primary)  -     famotidine (Pepcid) 40 MG tablet; Take 1 tablet by mouth Daily.  Dispense: 30 tablet; Refill: 2    2. Encounter for surveillance of contraceptive pills  -     Levonorgest-Eth Estrad 91-Day 0.15-0.03 &0.01 MG; Take 1 tablet by mouth Daily.  Dispense: 90 tablet; Refill: 1    3. MERLIN (generalized anxiety disorder)  -     busPIRone (BUSPAR) 5 MG tablet; Take 1 tablet by mouth 3 (Three) Times a Day As Needed (anxiety).  Dispense: 30 tablet; Refill: 1    4. PMDD (premenstrual dysphoric disorder)  -     busPIRone (BUSPAR) 5 MG tablet; Take 1 tablet by mouth 3 (Three) Times a Day As Needed (anxiety).  Dispense: 30 tablet; Refill: 1        Assessment & Plan  Elevated b/p without Hypertension.  The patient's diastolic blood pressure is slightly elevated during this visit. The patient has been advised to maintain adequate hydration and to monitor her blood pressure at home, ensuring the diastolic readings not exceeding 90.    2. Birth control.  Ocp changed to [place].    3. Anxiety.  The patient was informed that BuSpar is not recommended for as-needed use, but can be used as needed. The patient has been advised to take BuSpar 5 mg a few days prior to her menstrual cycle or if she experiences similar symptoms, in conjunction with Trintellix and Wellbutrin.    4. Acid reflux.  The patient's symptoms are  likely due to reflux. A prescription for Pepcid has been issued.    Follow Up     Return in about 3 months (around 7/8/2024) for Next scheduled follow up.    Patient was given instructions and counseling regarding her condition or for health maintenance advice. Please see specific information pulled into the AVS if appropriate.     HEATH Dickinson    Patient or patient representative verbalized consent for the use of Ambient Listening during the visit with  HEATH Dickinson for chart documentation. 4/8/2024  12:51 EDT

## 2024-03-14 DIAGNOSIS — F33.1 MAJOR DEPRESSIVE DISORDER, RECURRENT EPISODE, MODERATE: ICD-10-CM

## 2024-03-14 DIAGNOSIS — F41.1 GAD (GENERALIZED ANXIETY DISORDER): ICD-10-CM

## 2024-03-15 RX ORDER — DESVENLAFAXINE 25 MG/1
TABLET, EXTENDED RELEASE ORAL
Qty: 30 TABLET | Refills: 1 | OUTPATIENT
Start: 2024-03-15

## 2024-03-15 NOTE — TELEPHONE ENCOUNTER
DESVENLAFAXINE ER SUCCINATE 25MG T     The original prescription was discontinued on 2/19/2024 by Moises Benedict MD. Renewing this prescription may not be appropriate.       Last refill: 2/7/2024     Follow up 04/08/2024

## 2024-03-18 DIAGNOSIS — Z30.41 ENCOUNTER FOR SURVEILLANCE OF CONTRACEPTIVE PILLS: ICD-10-CM

## 2024-04-08 ENCOUNTER — OFFICE VISIT (OUTPATIENT)
Dept: PSYCHIATRY | Facility: CLINIC | Age: 24
End: 2024-04-08
Payer: COMMERCIAL

## 2024-04-08 ENCOUNTER — OFFICE VISIT (OUTPATIENT)
Dept: FAMILY MEDICINE CLINIC | Facility: CLINIC | Age: 24
End: 2024-04-08
Payer: COMMERCIAL

## 2024-04-08 VITALS
BODY MASS INDEX: 36.99 KG/M2 | HEIGHT: 62 IN | SYSTOLIC BLOOD PRESSURE: 123 MMHG | TEMPERATURE: 97.8 F | WEIGHT: 201 LBS | OXYGEN SATURATION: 98 % | HEART RATE: 68 BPM | DIASTOLIC BLOOD PRESSURE: 91 MMHG

## 2024-04-08 VITALS
BODY MASS INDEX: 37.36 KG/M2 | WEIGHT: 203 LBS | HEART RATE: 105 BPM | SYSTOLIC BLOOD PRESSURE: 136 MMHG | HEIGHT: 62 IN | DIASTOLIC BLOOD PRESSURE: 93 MMHG | OXYGEN SATURATION: 98 %

## 2024-04-08 DIAGNOSIS — F41.9 ANXIETY AND DEPRESSION: ICD-10-CM

## 2024-04-08 DIAGNOSIS — F32.A ANXIETY AND DEPRESSION: ICD-10-CM

## 2024-04-08 DIAGNOSIS — F33.41 MAJOR DEPRESSIVE DISORDER, RECURRENT EPISODE, IN PARTIAL REMISSION: Primary | ICD-10-CM

## 2024-04-08 DIAGNOSIS — F41.1 GAD (GENERALIZED ANXIETY DISORDER): ICD-10-CM

## 2024-04-08 DIAGNOSIS — F32.81 PMDD (PREMENSTRUAL DYSPHORIC DISORDER): ICD-10-CM

## 2024-04-08 DIAGNOSIS — Z30.41 ENCOUNTER FOR SURVEILLANCE OF CONTRACEPTIVE PILLS: ICD-10-CM

## 2024-04-08 DIAGNOSIS — K21.9 GASTROESOPHAGEAL REFLUX DISEASE WITHOUT ESOPHAGITIS: Primary | ICD-10-CM

## 2024-04-08 PROCEDURE — 99213 OFFICE O/P EST LOW 20 MIN: CPT | Performed by: NURSE PRACTITIONER

## 2024-04-08 PROCEDURE — 99214 OFFICE O/P EST MOD 30 MIN: CPT | Performed by: PSYCHIATRY & NEUROLOGY

## 2024-04-08 RX ORDER — BUSPIRONE HYDROCHLORIDE 5 MG/1
5 TABLET ORAL 3 TIMES DAILY PRN
Qty: 30 TABLET | Refills: 1 | Status: SHIPPED | OUTPATIENT
Start: 2024-04-08

## 2024-04-08 RX ORDER — BUPROPION HYDROCHLORIDE 150 MG/1
300 TABLET ORAL EVERY MORNING
Qty: 60 TABLET | Refills: 1 | Status: SHIPPED | OUTPATIENT
Start: 2024-04-08

## 2024-04-08 RX ORDER — LEVONORGESTREL / ETHINYL ESTRADIOL AND ETHINYL ESTRADIOL 150-30(84)
1 KIT ORAL DAILY
Qty: 90 TABLET | Refills: 1 | Status: SHIPPED | OUTPATIENT
Start: 2024-04-08

## 2024-04-08 RX ORDER — FAMOTIDINE 40 MG/1
40 TABLET, FILM COATED ORAL DAILY
Qty: 30 TABLET | Refills: 2 | Status: SHIPPED | OUTPATIENT
Start: 2024-04-08
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
## 2024-04-20 DIAGNOSIS — F41.9 ANXIETY AND DEPRESSION: ICD-10-CM

## 2024-04-20 DIAGNOSIS — F32.A ANXIETY AND DEPRESSION: ICD-10-CM

## 2024-04-22 RX ORDER — BUPROPION HYDROCHLORIDE 150 MG/1
300 TABLET ORAL EVERY MORNING
Qty: 60 TABLET | Refills: 1 | OUTPATIENT
Start: 2024-04-22

## 2024-04-22 RX ORDER — BUPROPION HYDROCHLORIDE 150 MG/1
300 TABLET ORAL EVERY MORNING
Qty: 60 TABLET | Refills: 1 | Status: SHIPPED | OUTPATIENT
Start: 2024-04-22

## 2024-04-22 NOTE — TELEPHONE ENCOUNTER
NEXT VISIT WITH PROVIDER   Appointment with Moises Benedict MD (06/03/2024)     LAST SEEN BY PROVIDER   Office Visit with Moises Benedict MD (04/08/2024)     LAST MED REFILL  buPROPion XL (Wellbutrin XL) 150 MG 24 hr tablet (04/08/2024)     TOO SOON FOR REFILL     PROVIDER PLEASE ADVISE

## 2024-05-10 ENCOUNTER — TELEPHONE (OUTPATIENT)
Dept: PSYCHIATRY | Facility: CLINIC | Age: 24
End: 2024-05-10
Payer: COMMERCIAL

## 2024-05-10 DIAGNOSIS — F41.9 ANXIETY AND DEPRESSION: ICD-10-CM

## 2024-05-10 DIAGNOSIS — F32.A ANXIETY AND DEPRESSION: ICD-10-CM

## 2024-05-10 DIAGNOSIS — F41.1 GAD (GENERALIZED ANXIETY DISORDER): ICD-10-CM

## 2024-05-21 DIAGNOSIS — F32.A ANXIETY AND DEPRESSION: ICD-10-CM

## 2024-05-21 DIAGNOSIS — F41.9 ANXIETY AND DEPRESSION: ICD-10-CM

## 2024-05-21 RX ORDER — CETIRIZINE HYDROCHLORIDE 10 MG/1
10 TABLET ORAL AS NEEDED
Qty: 90 TABLET | Refills: 2 | Status: SHIPPED | OUTPATIENT
Start: 2024-05-21

## 2024-05-21 RX ORDER — BUPROPION HYDROCHLORIDE 150 MG/1
300 TABLET ORAL EVERY MORNING
Qty: 60 TABLET | Refills: 1 | Status: CANCELLED | OUTPATIENT
Start: 2024-05-21

## 2024-05-21 NOTE — TELEPHONE ENCOUNTER
Caller: Pierce Talaveraray SCOTT    Relationship: Self    Best call back number: [phone]    Requested Prescriptions:   Requested Prescriptions     Pending Prescriptions Disp Refills    buPROPion XL (Wellbutrin XL) 150 MG 24 hr tablet 60 tablet 1     Sig: Take 2 tablets by mouth Every Morning.    cetirizine (zyrTEC) 10 MG tablet 90 tablet 2     Sig: Take 1 tablet by mouth As Needed (As Needed).        Pharmacy where request should be sent: [facility]/PHARMACY #[id number] [place], [place] - [address] [place] [facility] [phone] [facility] [phone] FX     Last office visit with prescribing clinician: 4/8/2024   Last telemedicine visit with prescribing clinician: Visit date not found   Next office visit with prescribing clinician: 7/8/2024     Additional details provided by patient: DUE TO INSURANCE, PATIENT IS NEEDING 90 DAY SUPPLY SENT TO PHARMACY TO BE FILLED. PATIENT WILL BE OUT OF THESE TODAY, 05/21/2024.     Does the patient have less than a 3 day supply:  [x] Yes  [] No    Would you like a call back once the refill request has been completed: [] Yes [x] No    If the office needs to give you a call back, can they leave a voicemail: [] Yes [x] No    Eufemia Mora Rep   05/21/24 09:08 EDT

## 2024-06-03 ENCOUNTER — OFFICE VISIT (OUTPATIENT)
Dept: PSYCHIATRY | Facility: CLINIC | Age: 24
End: 2024-06-03
Payer: COMMERCIAL

## 2024-06-03 VITALS
HEIGHT: 62 IN | WEIGHT: 198.4 LBS | HEART RATE: 84 BPM | SYSTOLIC BLOOD PRESSURE: 126 MMHG | BODY MASS INDEX: 36.51 KG/M2 | DIASTOLIC BLOOD PRESSURE: 91 MMHG

## 2024-06-03 DIAGNOSIS — F33.42 MAJOR DEPRESSIVE DISORDER, RECURRENT, IN FULL REMISSION: Primary | ICD-10-CM

## 2024-06-03 DIAGNOSIS — F41.1 GAD (GENERALIZED ANXIETY DISORDER): ICD-10-CM

## 2024-06-03 PROCEDURE — 99214 OFFICE O/P EST MOD 30 MIN: CPT | Performed by: PSYCHIATRY & NEUROLOGY
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
## 2024-06-17 NOTE — PROGRESS NOTES
Chief Complaint    Annual Exam  Annual Exam and Heartburn (Follow up)    Subjective          Giselle Talavera is a 23 y.o. female who presents to [facility]    Annual Exam    History of Present Illness  The patient presents for annual physical.    The patient reports experiencing stress, despite not consuming an excessive amount of salt. She acknowledges the need to schedule a dental examination and undergoes eye examinations every few years. She denies experiencing a lack of interest or pleasure in activities in the past two weeks and denies feelings of depression or hopelessness. She acknowledges the efficacy of Wellbutrin and BuSpar, which she takes as needed. She experiences feelings of sadness, moodiness, and anger, particularly around her menstrual cycle. She requires a refill of Zyrtec and Pepcid, which she takes as required. She is managing well with her birth control, which she reports as effective. She experiences a menstrual cycle every three months, which is due next week. She experiences seasonal allergies, predominantly in the spring and beginning of summer. Her reflux is well-managed with Pepcid, which she takes as needed.          PHQ-2 Total Score: 0   PHQ-9 Total Score: 0        Review of Systems   Constitutional:  Negative for chills, fatigue and fever.   Respiratory:  Negative for cough and shortness of breath.    Cardiovascular:  Negative for chest pain and palpitations.   Gastrointestinal:  Negative for constipation, diarrhea, nausea and vomiting.   Musculoskeletal:  Negative for back pain and neck pain.   Skin:  Negative for rash.   Allergic/Immunologic: Positive for environmental allergies.   Neurological:  Negative for dizziness and headaches.          Medical History: has a past medical history of Allergic, Anxiety and depression (06/03/2019), Chronic fatigue, unspecified, Hypothyroidism, and Trauma.     Surgical History: has a past surgical history that  includes Adenoidectomy and Tonsillectomy.     Family History: family history includes Anxiety disorder in her paternal grandmother; Diabetes in her paternal grandfather; Heart disease in an other family member; Hypertension in her maternal grandfather, paternal grandfather, and another family member; No Known Problems in her brother, cousin, father, maternal aunt, maternal grandmother, maternal uncle, mother, paternal aunt, paternal uncle, and sister.     Social History: reports that she has never smoked. She has been exposed to tobacco smoke. She has never used smokeless tobacco. She reports that she does not drink alcohol and does not use drugs.    Allergies: Patient has no known allergies.      Health Maintenance Due   Topic Date Due    HPV VACCINES (1 - 3-dose series) Never done    COVID-19 Vaccine (3 - 2023-24 season) 09/01/2023    ANNUAL PHYSICAL  04/24/2024            Current Outpatient Medications:     buPROPion XL (Wellbutrin XL) 150 MG 24 hr tablet, Take 2 tablets by mouth Every Morning., Disp: 180 tablet, Rfl: 0    busPIRone (BUSPAR) 5 MG tablet, Take 1 tablet by mouth 3 (Three) Times a Day As Needed (anxiety)., Disp: 270 tablet, Rfl: 0    cetirizine (zyrTEC) 10 MG tablet, Take 1 tablet by mouth As Needed (As Needed)., Disp: 90 tablet, Rfl: 2    famotidine (Pepcid) 40 MG tablet, Take 1 tablet by mouth Daily., Disp: 30 tablet, Rfl: 2    fluticasone (FLONASE) 50 MCG/ACT nasal spray, 2 sprays into the nostril(s) as directed by provider Daily., Disp: 16 g, Rfl: 5    Levonorgest-Eth Estrad 91-Day 0.15-0.03 &0.01 MG, Take 1 tablet by mouth Daily., Disp: 90 tablet, Rfl: 1    Vortioxetine HBr (TRINTELLIX) 10 MG tablet tablet, Take 1 tablet by mouth Daily With Breakfast for 90 days., Disp: 90 tablet, Rfl: 0    hydroCHLOROthiazide 12.5 MG tablet, Take 1 tablet by mouth Every Morning., Disp: 90 tablet, Rfl: 1    montelukast (Singulair) 10 MG tablet, Take 1 tablet by mouth Every Night., Disp: 90 tablet, Rfl:  "1      Immunization History   Administered Date(s) Administered    COVID-19 (MODERNA) 1st,2nd,3rd Dose Monovalent 05/12/2021, 06/10/2021    Flu Vaccine Quad PF >36MO 12/17/2018    Flu Vaccine Split Quad 10/18/2019    Fluzone (or Fluarix & Flulaval for VFC) >6mos 10/07/2021, 10/04/2023    Fluzone Quad >6mos (Multi-dose) 01/13/2021, 10/01/2021    Influenza Quad Vaccine (Inpatient) 01/13/2021    Influenza, Unspecified 01/13/2021, 10/07/2021    Tdap 12/17/2018, 12/17/2018         Objective       Vitals:    07/08/24 1339   BP: 129/93   Pulse: 84   Temp: 98.3 °F (36.8 °C)   TempSrc: Temporal   SpO2: 98%   Weight: 88.7 kg (195 lb 9.6 oz)   Height: 157.5 cm (62.01\")      Body mass index is 35.77 kg/m².   Wt Readings from Last 3 Encounters:   07/08/24 88.7 kg (195 lb 9.6 oz)   06/03/24 90 kg (198 lb 6.4 oz)   04/08/24 92.1 kg (203 lb)      BP Readings from Last 3 Encounters:   07/08/24 129/93   06/03/24 126/91   04/08/24 136/93                 Physical Exam  Vitals reviewed.   Constitutional:       Appearance: Normal appearance. She is well-developed.   HENT:      Head: Normocephalic and atraumatic.   Eyes:      Conjunctiva/sclera: Conjunctivae normal.      Pupils: Pupils are equal, round, and reactive to light.   Cardiovascular:      Rate and Rhythm: Normal rate and regular rhythm.      Heart sounds: Normal heart sounds. No murmur heard.  Pulmonary:      Effort: Pulmonary effort is normal.      Breath sounds: Normal breath sounds. No wheezing or rhonchi.   Abdominal:      General: Bowel sounds are normal. There is no distension.      Palpations: Abdomen is soft.      Tenderness: There is no abdominal tenderness.   Skin:     General: Skin is warm and dry.   Neurological:      Mental Status: She is alert and oriented to person, place, and time.   Psychiatric:         Mood and Affect: Mood and affect normal.         Behavior: Behavior normal.         Thought Content: Thought content normal.         Judgment: Judgment normal. "       Physical Exam        Result Review :         Results                   Assessment and Plan      Assessment & Plan  1. Annual physical.  The patient's Pap smear yielded normal results. A prescription for hydrochlorothiazide has been issued, with instructions to take it in the morning upon waking. The patient has been advised to monitor her blood pressure at home to ensure it does not consistently exceed 135/85. Refills for Zyrtec and birth control have been provided. Blood work has been ordered.    Diagnoses and all orders for this visit:    1. Annual physical exam (Primary)  -     CBC (No Diff); Future  -     CBC (No Diff)    2. Hypothyroidism, unspecified type  -     TSH; Future  -     TSH    3. Screening for lipid disorders  -     Lipid Panel; Future  -     Comprehensive Metabolic Panel; Future  -     Comprehensive Metabolic Panel  -     Lipid Panel    4. Elevated blood pressure reading without diagnosis of hypertension  -     hydroCHLOROthiazide 12.5 MG tablet; Take 1 tablet by mouth Every Morning.  Dispense: 90 tablet; Refill: 1    5. Encounter for surveillance of contraceptive pills  -     Levonorgest-Eth Estrad 91-Day 0.15-0.03 &0.01 MG; Take 1 tablet by mouth Daily.  Dispense: 90 tablet; Refill: 1    6. Seasonal allergic rhinitis due to pollen  -     cetirizine (zyrTEC) 10 MG tablet; Take 1 tablet by mouth As Needed (As Needed).  Dispense: 90 tablet; Refill: 2  -     montelukast (Singulair) 10 MG tablet; Take 1 tablet by mouth Every Night.  Dispense: 90 tablet; Refill: 1      Patient advised to monitor blood pressure at home and journal readings.  Patient informed that a blood pressure reading at home of more than 130/80 is considered hypertension.  For readings greater than 140/90 or higher patient is advised to follow-up in the office with readings for management.  Patient advised to limit sodium intake.    Follow Up     Return in about 1 year (around 7/8/2025) for Next scheduled follow up.    Updated  annual wellness visit checklist.  Immunizations discussed.  Screening discussed and/or ordered.  Recommend yearly dental and eye exams. Also discussed monitoring of blood pressure and lipids.      Patient was given instructions and counseling regarding her condition or for health maintenance advice. Please see specific information pulled into the AVS if appropriate.     Patient or patient representative verbalized consent for the use of Ambient Listening during the visit with  HEATH Dickinson for chart documentation. 7/8/2024  15:19 EDT    HEATH Dickinson

## 2024-07-08 ENCOUNTER — OFFICE VISIT (OUTPATIENT)
Dept: FAMILY MEDICINE CLINIC | Facility: CLINIC | Age: 24
End: 2024-07-08
Payer: COMMERCIAL

## 2024-07-08 VITALS
WEIGHT: 195.6 LBS | HEIGHT: 62 IN | TEMPERATURE: 98.3 F | HEART RATE: 84 BPM | BODY MASS INDEX: 35.99 KG/M2 | SYSTOLIC BLOOD PRESSURE: 129 MMHG | DIASTOLIC BLOOD PRESSURE: 93 MMHG | OXYGEN SATURATION: 98 %

## 2024-07-08 DIAGNOSIS — Z13.220 SCREENING FOR LIPID DISORDERS: ICD-10-CM

## 2024-07-08 DIAGNOSIS — E03.9 HYPOTHYROIDISM, UNSPECIFIED TYPE: ICD-10-CM

## 2024-07-08 DIAGNOSIS — Z30.41 ENCOUNTER FOR SURVEILLANCE OF CONTRACEPTIVE PILLS: ICD-10-CM

## 2024-07-08 DIAGNOSIS — R03.0 ELEVATED BLOOD PRESSURE READING WITHOUT DIAGNOSIS OF HYPERTENSION: ICD-10-CM

## 2024-07-08 DIAGNOSIS — J30.1 SEASONAL ALLERGIC RHINITIS DUE TO POLLEN: ICD-10-CM

## 2024-07-08 DIAGNOSIS — Z00.00 ANNUAL PHYSICAL EXAM: Primary | ICD-10-CM

## 2024-07-08 LAB
ALBUMIN SERPL-MCNC: 4.2 G/DL (ref 3.5–5.2)
ALBUMIN/GLOB SERPL: 1.5 G/DL
ALP SERPL-CCNC: 68 U/L (ref 39–117)
ALT SERPL W P-5'-P-CCNC: 13 U/L (ref 1–33)
ANION GAP SERPL CALCULATED.3IONS-SCNC: 11 MMOL/L (ref 5–15)
AST SERPL-CCNC: 14 U/L (ref 1–32)
BILIRUB SERPL-MCNC: 0.4 MG/DL (ref 0–1.2)
BUN SERPL-MCNC: 12 MG/DL (ref 6–20)
BUN/CREAT SERPL: 14.5 (ref 7–25)
CALCIUM SPEC-SCNC: 9.6 MG/DL (ref 8.6–10.5)
CHLORIDE SERPL-SCNC: 106 MMOL/L (ref 98–107)
CHOLEST SERPL-MCNC: 211 MG/DL (ref 0–200)
CO2 SERPL-SCNC: 22 MMOL/L (ref 22–29)
CREAT SERPL-MCNC: 0.83 MG/DL (ref 0.57–1)
DEPRECATED RDW RBC AUTO: 39.5 FL (ref 37–54)
EGFRCR SERPLBLD CKD-EPI 2021: 100.5 ML/MIN/1.73
ERYTHROCYTE [DISTWIDTH] IN BLOOD BY AUTOMATED COUNT: 12.1 % (ref 12.3–15.4)
GLOBULIN UR ELPH-MCNC: 2.8 GM/DL
GLUCOSE SERPL-MCNC: 84 MG/DL (ref 65–99)
HCT VFR BLD AUTO: 44.5 % (ref 34–46.6)
HDLC SERPL-MCNC: 50 MG/DL (ref 40–60)
HGB BLD-MCNC: 14.8 G/DL (ref 12–15.9)
LDLC SERPL CALC-MCNC: 141 MG/DL (ref 0–100)
LDLC/HDLC SERPL: 2.78 {RATIO}
MCH RBC QN AUTO: 30 PG (ref 26.6–33)
MCHC RBC AUTO-ENTMCNC: 33.3 G/DL (ref 31.5–35.7)
MCV RBC AUTO: 90.1 FL (ref 79–97)
PLATELET # BLD AUTO: 318 10*3/MM3 (ref 140–450)
PMV BLD AUTO: 9.3 FL (ref 6–12)
POTASSIUM SERPL-SCNC: 4.2 MMOL/L (ref 3.5–5.2)
PROT SERPL-MCNC: 7 G/DL (ref 6–8.5)
RBC # BLD AUTO: 4.94 10*6/MM3 (ref 3.77–5.28)
SODIUM SERPL-SCNC: 139 MMOL/L (ref 136–145)
TRIGL SERPL-MCNC: 111 MG/DL (ref 0–150)
TSH SERPL DL<=0.05 MIU/L-ACNC: 7.42 UIU/ML (ref 0.27–4.2)
VLDLC SERPL-MCNC: 20 MG/DL (ref 5–40)
WBC NRBC COR # BLD AUTO: 9.45 10*3/MM3 (ref 3.4–10.8)

## 2024-07-08 PROCEDURE — 80061 LIPID PANEL: CPT | Performed by: NURSE PRACTITIONER

## 2024-07-08 PROCEDURE — 80050 GENERAL HEALTH PANEL: CPT | Performed by: NURSE PRACTITIONER

## 2024-07-08 PROCEDURE — 36415 COLL VENOUS BLD VENIPUNCTURE: CPT | Performed by: NURSE PRACTITIONER

## 2024-07-08 PROCEDURE — 99395 PREV VISIT EST AGE 18-39: CPT | Performed by: NURSE PRACTITIONER

## 2024-07-08 RX ORDER — HYDROCHLOROTHIAZIDE 12.5 MG/1
12.5 TABLET ORAL EVERY MORNING
Qty: 90 TABLET | Refills: 1 | Status: SHIPPED | OUTPATIENT
Start: 2024-07-08

## 2024-07-08 RX ORDER — CETIRIZINE HYDROCHLORIDE 10 MG/1
10 TABLET ORAL AS NEEDED
Qty: 90 TABLET | Refills: 2 | Status: SHIPPED | OUTPATIENT
Start: 2024-07-08

## 2024-07-08 RX ORDER — MONTELUKAST SODIUM 10 MG/1
10 TABLET ORAL NIGHTLY
Qty: 90 TABLET | Refills: 1 | Status: SHIPPED | OUTPATIENT
Start: 2024-07-08

## 2024-07-08 RX ORDER — LEVONORGESTREL / ETHINYL ESTRADIOL AND ETHINYL ESTRADIOL 150-30(84)
1 KIT ORAL DAILY
Qty: 90 TABLET | Refills: 1 | Status: SHIPPED | OUTPATIENT
Start: 2024-07-08

## 2024-07-11 DIAGNOSIS — E03.9 HYPOTHYROIDISM, UNSPECIFIED TYPE: Primary | ICD-10-CM

## 2024-07-11 RX ORDER — LEVOTHYROXINE SODIUM 0.05 MG/1
50 TABLET ORAL DAILY
Qty: 60 TABLET | Refills: 0 | Status: SHIPPED | OUTPATIENT
Start: 2024-07-11

## 2024-08-11 DIAGNOSIS — F41.1 GAD (GENERALIZED ANXIETY DISORDER): ICD-10-CM

## 2024-08-12 NOTE — TELEPHONE ENCOUNTER
NEXT VISIT WITH PROVIDER   Appointment with Moises Benedict MD (09/09/2024)     LAST SEEN BY PROVIDER   Office Visit with Moises Benedict MD (06/03/2024)     LAST MED REFILL  Vortioxetine HBr (TRINTELLIX) 10 MG tablet tablet (05/10/2024)     PROVIDER PLEASE ADVISE

## 2024-08-21 DIAGNOSIS — F32.A ANXIETY AND DEPRESSION: ICD-10-CM

## 2024-08-21 DIAGNOSIS — F41.9 ANXIETY AND DEPRESSION: ICD-10-CM

## 2024-08-22 RX ORDER — BUPROPION HYDROCHLORIDE 150 MG/1
300 TABLET ORAL EVERY MORNING
Qty: 180 TABLET | Refills: 0 | Status: SHIPPED | OUTPATIENT
Start: 2024-08-22

## 2024-08-22 NOTE — TELEPHONE ENCOUNTER
NEXT VISIT WITH PROVIDER   Appointment with Moises Benedict MD (09/09/2024)     LAST SEEN BY PROVIDER   Office Visit with Moises Benedict MD (06/03/2024)     LAST MED REFILL  buPROPion XL (Wellbutrin XL) 150 MG 24 hr tablet (05/22/2024)     PROVIDER PLEASE ADVISE

## 2024-09-09 ENCOUNTER — OFFICE VISIT (OUTPATIENT)
Dept: PSYCHIATRY | Facility: CLINIC | Age: 24
End: 2024-09-09
Payer: COMMERCIAL

## 2024-09-09 VITALS
DIASTOLIC BLOOD PRESSURE: 91 MMHG | WEIGHT: 193 LBS | HEART RATE: 77 BPM | BODY MASS INDEX: 35.51 KG/M2 | SYSTOLIC BLOOD PRESSURE: 136 MMHG | HEIGHT: 62 IN

## 2024-09-09 DIAGNOSIS — F32.81 PMDD (PREMENSTRUAL DYSPHORIC DISORDER): ICD-10-CM

## 2024-09-09 DIAGNOSIS — F41.1 GAD (GENERALIZED ANXIETY DISORDER): ICD-10-CM

## 2024-09-09 DIAGNOSIS — F33.42 MAJOR DEPRESSION, RECURRENT, FULL REMISSION: Primary | ICD-10-CM

## 2024-09-09 PROCEDURE — 99214 OFFICE O/P EST MOD 30 MIN: CPT | Performed by: PSYCHIATRY & NEUROLOGY

## 2024-09-09 RX ORDER — BUSPIRONE HYDROCHLORIDE 5 MG/1
5 TABLET ORAL 3 TIMES DAILY PRN
Qty: 270 TABLET | Refills: 0 | Status: SHIPPED | OUTPATIENT
Start: 2024-09-09
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
## 2024-09-19 DIAGNOSIS — E03.9 HYPOTHYROIDISM, UNSPECIFIED TYPE: ICD-10-CM

## 2024-09-19 RX ORDER — LEVOTHYROXINE SODIUM 50 UG/1
50 TABLET ORAL DAILY
Qty: 90 TABLET | Refills: 1 | Status: SHIPPED | OUTPATIENT
Start: 2024-09-19

## 2024-11-07 DIAGNOSIS — F41.1 GAD (GENERALIZED ANXIETY DISORDER): ICD-10-CM

## 2024-11-07 NOTE — TELEPHONE ENCOUNTER
NEXT VISIT WITH PROVIDER   Appointment with Moises Benedict MD (12/09/2024)     LAST SEEN BY PROVIDER   Office Visit with Moises Benedict MD (09/09/2024)     LAST MED REFILL  Vortioxetine HBr (TRINTELLIX) 10 MG tablet tablet (08/12/2024)     PROVIDER PLEASE ADVISE

## 2024-11-11 DIAGNOSIS — J30.1 SEASONAL ALLERGIC RHINITIS DUE TO POLLEN: ICD-10-CM

## 2024-11-11 DIAGNOSIS — F32.A ANXIETY AND DEPRESSION: ICD-10-CM

## 2024-11-11 DIAGNOSIS — F41.9 ANXIETY AND DEPRESSION: ICD-10-CM

## 2024-11-11 RX ORDER — FLUTICASONE PROPIONATE 50 MCG
2 SPRAY, SUSPENSION (ML) NASAL DAILY
Qty: 16 G | Refills: 5 | Status: SHIPPED | OUTPATIENT
Start: 2024-11-11

## 2024-11-11 RX ORDER — BUPROPION HYDROCHLORIDE 150 MG/1
300 TABLET ORAL EVERY MORNING
Qty: 180 TABLET | Refills: 0 | Status: SHIPPED | OUTPATIENT
Start: 2024-11-11 | End: 2024-11-18

## 2024-11-11 NOTE — TELEPHONE ENCOUNTER
NEXT VISIT WITH PROVIDER   Appointment with Moises Benedict MD (12/09/2024)     LAST SEEN BY PROVIDER   Office Visit with Moises Benedict MD (09/09/2024)     LAST MED REFILL  buPROPion XL (Wellbutrin XL) 150 MG 24 hr tablet (08/22/2024)     PROVIDER PLEASE ADVISE

## 2024-11-17 DIAGNOSIS — F41.9 ANXIETY AND DEPRESSION: ICD-10-CM

## 2024-11-17 DIAGNOSIS — F32.A ANXIETY AND DEPRESSION: ICD-10-CM

## 2024-11-18 RX ORDER — BUPROPION HYDROCHLORIDE 150 MG/1
300 TABLET ORAL EVERY MORNING
Qty: 180 TABLET | Refills: 0 | Status: SHIPPED | OUTPATIENT
Start: 2024-11-18

## 2024-11-18 NOTE — TELEPHONE ENCOUNTER
NEXT VISIT WITH PROVIDER   Appointment with Moises Benedict MD (12/09/2024)     LAST SEEN BY PROVIDER   Office Visit with Moises Benedict MD (09/09/2024)     LAST MED REFILL  buPROPion XL (Wellbutrin XL) 150 MG 24 hr tablet (11/11/2024)     TOO SOON FOR REFILL     PROVIDER PLEASE ADVISE

## 2024-12-09 ENCOUNTER — OFFICE VISIT (OUTPATIENT)
Dept: PSYCHIATRY | Facility: CLINIC | Age: 24
End: 2024-12-09
Payer: COMMERCIAL

## 2024-12-09 VITALS
SYSTOLIC BLOOD PRESSURE: 130 MMHG | OXYGEN SATURATION: 96 % | WEIGHT: 189 LBS | HEART RATE: 93 BPM | BODY MASS INDEX: 34.78 KG/M2 | DIASTOLIC BLOOD PRESSURE: 90 MMHG | HEIGHT: 62 IN

## 2024-12-09 DIAGNOSIS — F32.81 PMDD (PREMENSTRUAL DYSPHORIC DISORDER): ICD-10-CM

## 2024-12-09 DIAGNOSIS — F90.9 ATTENTION DEFICIT HYPERACTIVITY DISORDER (ADHD), UNSPECIFIED ADHD TYPE: Primary | ICD-10-CM

## 2024-12-09 DIAGNOSIS — F33.1 MAJOR DEPRESSIVE DISORDER, RECURRENT EPISODE, MODERATE: ICD-10-CM

## 2024-12-09 DIAGNOSIS — F41.1 GAD (GENERALIZED ANXIETY DISORDER): ICD-10-CM

## 2024-12-09 PROCEDURE — 90833 PSYTX W PT W E/M 30 MIN: CPT | Performed by: PSYCHIATRY & NEUROLOGY

## 2024-12-09 PROCEDURE — 99214 OFFICE O/P EST MOD 30 MIN: CPT | Performed by: PSYCHIATRY & NEUROLOGY

## 2024-12-09 RX ORDER — ATOMOXETINE 25 MG/1
25 CAPSULE ORAL EVERY MORNING
Qty: 30 CAPSULE | Refills: 1 | Status: SHIPPED | OUTPATIENT
Start: 2024-12-09

## 2024-12-09 RX ORDER — BUSPIRONE HYDROCHLORIDE 5 MG/1
5 TABLET ORAL 3 TIMES DAILY PRN
Qty: 270 TABLET | Refills: 0 | Status: SHIPPED | OUTPATIENT
Start: 2024-12-09
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
## 2024-12-09 NOTE — TREATMENT PLAN
Multi-Disciplinary Problems (from Behavioral Health Treatment Plan)      Active Problems       Problem:  Patient Care Overview (Adult)  Start Date: 12/09/24      Problem Details: Treatment Planning for Giselle    Applicable diagnoses/problems:    - ADHD: practice behaviors that are conducive to creating functional routines (set a medication schedule, a sleep schedule, an activity schedule, limits on spending and other potential items of impulsivity); work towards optimizing a balance of utilizing compensatory mechanisms (including medications), accepting aid from applicable social communities/family, and validation of variable attention-stimulus traits in order to optimize daily functioning.  - progress: N/A; newly identified issue  - frequency of intervention: as needed  - duration of treatment: until optimized functional status is met    - Depression: enhance motivation and interest with regard to ego-syntonic items of living via routine building and disruption of inhibitory executive cognitive circuits; challenge withdrawal from ego-syntonic items of living; cultivate chandler and satisfaction via a consistent practice of appreciation; consistently practice redirection from intrusive ego-dystonic thoughts towards actionable items to reduce influence these thoughts have in emotions and behavior.  - progress: good, nearing goal  - frequency of intervention: as needed  - duration of treatment: until optimized functional status is met    - Anxiety: enhance engagement with regard to ego-syntonic items of living via routine building and disruption of inhibitory executive cognitive circuits; challenge avoidance of ego-syntonic items of living via disruption to perceived barriers; reduce salience of fears and overwhelm with regard to their effects on thinking and behavior.  - progress: moderate, progressing  - frequency of intervention: as needed  - duration of treatment: until optimized functional status is met        Goal  Priority Start Date Expected End Date End Date    Plan of Care Review -- 12/09/24 06/09/25 --

## 2024-12-27 DIAGNOSIS — R03.0 ELEVATED BLOOD PRESSURE READING WITHOUT DIAGNOSIS OF HYPERTENSION: ICD-10-CM

## 2024-12-27 RX ORDER — HYDROCHLOROTHIAZIDE 12.5 MG/1
12.5 TABLET ORAL EVERY MORNING
Qty: 90 TABLET | Refills: 1 | Status: SHIPPED | OUTPATIENT
Start: 2024-12-27

## 2025-01-04 DIAGNOSIS — Z30.41 ENCOUNTER FOR SURVEILLANCE OF CONTRACEPTIVE PILLS: ICD-10-CM

## 2025-01-07 RX ORDER — LEVONORGESTREL / ETHINYL ESTRADIOL AND ETHINYL ESTRADIOL 150-30(84)
1 KIT ORAL DAILY
Qty: 90 TABLET | Refills: 0 | Status: SHIPPED | OUTPATIENT
Start: 2025-01-07

## 2025-01-13 NOTE — PROGRESS NOTES
Chief Complaint  Heartburn (Acid reflux for months, famotidine has not really helped.)    Subjective          Giselle Talavera is a 24 y.o. female who presents to [facility]    History of Present Illness  History of Present Illness  The patient presents for evaluation of hypertension and acid reflux.    She has been experiencing persistent acid reflux for several months, with two episodes of exacerbation in the past week. She reports no specific dietary triggers but notes that the condition often disrupts her sleep. Her symptoms include a sensation of heaviness in her stomach and discomfort when lying down. 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 She is not a smoker or drinker. She was prescribed famotidine in late November or early December 2024, which she has been taking as needed. She is uncertain if this medication is appropriate given her family history of acid reflux. She has found some relief with Midol and over-the-counter antacids such as Tums. She has not been adhering to a nightly regimen of Pepcid.    She has been managing her hypertension with medication. She also reports a decrease in appetite during the holiday season due to increased work hours and reduced sleep.    Supplemental Information  She is on Strattera for ADHD. She is trying to find something that is going to help her focus. Her depression is better, but her anxiety gets worse. She is trying to see if the ADHD medication works, but if not, she will have to try something else.    SOCIAL HISTORY  She does not smoke or drink alcohol.    FAMILY HISTORY  Her father has high blood pressure. Her mother experiences acid reflux and heartburn. Her great-grandfather had severe acid reflux and heartburn and developed throat and mouth cancer, but he also smoked and drank heavily.    MEDICATIONS  Current: Strattera, BuSpar, sertraline, birth control, thyroid, Trintellix, famotidine,  omeprazole, Midol, Tums      Little interest or pleasure in doing things? Over half   Feeling down, depressed, or hopeless? Over half   PHQ-2 Total Score 4   Trouble falling or staying asleep, or sleeping too much? Not at all   Feeling tired or having little energy? Almost all   Poor appetite or overeating? Not at all   Feeling bad about yourself - or that you are a failure or have let yourself or your family down? Over half   Trouble concentrating on things, such as reading the newspaper or watching television? Almost all   Moving or speaking so slowly that other people could have noticed? Or the opposite - being so fidgety or restless that you have been moving around a lot more than usual? Not at all   Thoughts that you would be better off dead, or of hurting yourself in some way? Not at all   PHQ-9 Total Score 12   If you checked off any problems, how difficult have these problems made it for you to do your work, take care of things at home, or get along with other people? Very difficult                Health Maintenance Due   Topic Date Due    HPV VACCINES (1 - 3-dose series) Never done    COVID-19 Vaccine (3 - 2024-25 season) 09/01/2024        Review of Systems   Constitutional:  Positive for fatigue. Negative for chills, diaphoresis and fever.   HENT:  Negative for congestion and sore throat.    Respiratory:  Negative for cough.    Cardiovascular:  Positive for chest pain.   Gastrointestinal:  Positive for abdominal pain and nausea. Negative for vomiting.   Genitourinary:  Negative for dysuria.   Musculoskeletal:  Negative for myalgias and neck pain.   Skin:  Negative for rash.   Neurological:  Positive for headaches. Negative for weakness and numbness.          Medical History: has a past medical history of ADHD (attention deficit hyperactivity disorder), Allergic, Anxiety and depression (06/03/2019), Chronic fatigue, unspecified, Hypothyroidism, and Trauma.     Surgical History: has a past surgical history  that includes Adenoidectomy and Tonsillectomy.     Family History: family history includes Anxiety disorder in her paternal grandmother; Asthma in her father; Diabetes in her paternal grandfather; Heart disease in an other family member; Hypertension in her maternal grandfather, paternal grandfather, and another family member; No Known Problems in her brother, cousin, maternal aunt, maternal grandmother, maternal uncle, mother, paternal aunt, paternal uncle, and sister.     Social History: reports that she has never smoked. She has been exposed to tobacco smoke. She has never used smokeless tobacco. She reports that she does not drink alcohol and does not use drugs.    Allergies: Patient has no known allergies.      Current Outpatient Medications:     atomoxetine (Strattera) 40 MG capsule, Take 1 capsule by mouth Every Morning., Disp: 30 capsule, Rfl: 1    busPIRone (BUSPAR) 5 MG tablet, Take 1 tablet by mouth 3 (Three) Times a Day As Needed (anxiety)., Disp: 270 tablet, Rfl: 0    cetirizine (zyrTEC) 10 MG tablet, Take 1 tablet by mouth As Needed (As Needed)., Disp: 90 tablet, Rfl: 2    famotidine (Pepcid) 40 MG tablet, Take 1 tablet by mouth every night at bedtime., Disp: 90 tablet, Rfl: 1    fluticasone (FLONASE) 50 MCG/ACT nasal spray, Administer 2 sprays into the nostril(s) as directed by provider Daily., Disp: 16 g, Rfl: 5    hydroCHLOROthiazide 25 MG tablet, Take 1 tablet by mouth Every Morning., Disp: 90 tablet, Rfl: 1    Levonorgest-Eth Estrad 91-Day 0.15-0.03 &0.01 MG, Take 1 tablet by mouth Daily., Disp: 90 tablet, Rfl: 0    levothyroxine (Synthroid) 50 MCG tablet, Take 1 tablet by mouth Daily., Disp: 90 tablet, Rfl: 1    montelukast (Singulair) 10 MG tablet, Take 1 tablet by mouth Every Night., Disp: 90 tablet, Rfl: 1    Vortioxetine HBr (TRINTELLIX) 10 MG tablet tablet, Take 1 tablet by mouth Daily With Breakfast for 90 days., Disp: 90 tablet, Rfl: 0    omeprazole (priLOSEC) 20 MG capsule, Take 1 capsule  "by mouth Every Morning., Disp: 90 capsule, Rfl: 1      Immunization History   Administered Date(s) Administered    COVID-19 (MODERNA) 1st,2nd,3rd Dose Monovalent 05/12/2021, 06/10/2021    Flu Vaccine Quad PF >36MO 12/17/2018    Flu Vaccine Split Quad 10/18/2019    Fluzone  >6mos 01/13/2021, 01/20/2025    Fluzone (or Fluarix & Flulaval for VFC) >6mos 10/07/2021, 10/04/2023    Fluzone Quad >6mos (Multi-dose) 01/13/2021, 10/01/2021    Influenza, Unspecified 01/13/2021, 10/07/2021    Tdap 12/17/2018, 12/17/2018         Objective       Vitals:    01/20/25 1558   BP: 125/93   Pulse: 80   Temp: 98.1 °F (36.7 °C)   TempSrc: Temporal   SpO2: 98%   Weight: 86.1 kg (189 lb 12.8 oz)   Height: 157.5 cm (62.01\")      Body mass index is 34.71 kg/m².   Wt Readings from Last 3 Encounters:   01/20/25 86.1 kg (189 lb 12.8 oz)   12/09/24 85.7 kg (189 lb)   09/09/24 87.5 kg (193 lb)      BP Readings from Last 3 Encounters:   01/20/25 125/93   12/09/24 130/90   09/09/24 136/91             Physical Exam  Vitals reviewed.   Constitutional:       Appearance: Normal appearance. She is well-developed.   HENT:      Head: Normocephalic and atraumatic.   Eyes:      Conjunctiva/sclera: Conjunctivae normal.      Pupils: Pupils are equal, round, and reactive to light.   Cardiovascular:      Rate and Rhythm: Normal rate and regular rhythm.      Heart sounds: Normal heart sounds. No murmur heard.  Pulmonary:      Effort: Pulmonary effort is normal.      Breath sounds: Normal breath sounds. No wheezing or rhonchi.   Abdominal:      General: Bowel sounds are normal. There is no distension.      Palpations: Abdomen is soft.      Tenderness: There is no abdominal tenderness.   Skin:     General: Skin is warm and dry.   Neurological:      Mental Status: She is alert and oriented to person, place, and time.   Psychiatric:         Mood and Affect: Mood and affect normal.         Behavior: Behavior normal.         Thought Content: Thought content normal.    "      Judgment: Judgment normal.       Physical Exam  Lungs were auscultated.  No pain on palpation of abdomen.    Vital Signs  Blood pressure is elevated today. Weight has decreased.      Result Review :   Results           Common labs          7/8/2024    14:10   Common Labs   Glucose 84    BUN 12    Creatinine 0.83    Sodium 139    Potassium 4.2    Chloride 106    Calcium 9.6    Albumin 4.2    Total Bilirubin 0.4    Alkaline Phosphatase 68    AST (SGOT) 14    ALT (SGPT) 13    WBC 9.45    Hemoglobin 14.8    Hematocrit 44.5    Platelets 318    Total Cholesterol 211    Triglycerides 111    HDL Cholesterol 50    LDL Cholesterol  141                     Assessment and Plan        Diagnoses and all orders for this visit:    1. Need for influenza vaccination (Primary)  -     Fluzone >6mos ([id number])    2. Elevated blood pressure reading without diagnosis of hypertension  -     hydroCHLOROthiazide 25 MG tablet; Take 1 tablet by mouth Every Morning.  Dispense: 90 tablet; Refill: 1    3. Gastroesophageal reflux disease without esophagitis  -     omeprazole (priLOSEC) 20 MG capsule; Take 1 capsule by mouth Every Morning.  Dispense: 90 capsule; Refill: 1  -     famotidine (Pepcid) 40 MG tablet; Take 1 tablet by mouth every night at bedtime.  Dispense: 90 tablet; Refill: 1  -     H. Pylori Antibody, IgG (COR and [facility] only); Future        Assessment & Plan  1. Hypertension.  Her blood pressure readings have consistently been elevated during the last four visits. She is advised to monitor her blood pressure at home. The dosage of her current medication will be increased to 25 mg, which she should take in the morning. Until the new prescription is available, she can take two 12.5 mg tablets. The prescription will be sent to [facility] on [facility]. If her blood pressure does not decrease to below 135/85 within 2 weeks, she should inform us so that additional medication can be considered.    2. Acid reflux.  She has been  experiencing acid reflux for several months, with famotidine providing insufficient relief. She reports two flare-ups in the past week, sometimes waking her up at night. She has tried various antacids with occasional relief. Her weight has decreased, which is a positive development. Several of her medications, including Strattera, BuSpar, sertraline, birth control, thyroid, and Trintellix, can increase serotonin levels in the gut, potentially contributing to her reflux symptoms. She is advised to take Pepcid 40 mg every night and omeprazole 20 mg every morning on an empty stomach, waiting 30 minutes before eating or drinking. Dietary modifications, such as reducing caffeine, spicy foods, fried foods, and tomato sauces, are recommended. She is also advised to avoid eating a few hours before bedtime. Reflux education will be provided. An H. pylori test will be ordered to rule out bacterial infection. The prescription will be sent to [facility] on [facility]. If her symptoms do not improve with the current medications, she should inform us.    Follow-up  The patient will follow up in July 2025.    Return in about 3 months (around 4/20/2025) for Keep follow up as scheduled.    Patient was given instructions and counseling regarding her condition or for health maintenance advice. Please see specific information pulled into the AVS if appropriate.     HEATH Dickinson    Patient or patient representative verbalized consent for the use of Ambient Listening during the visit with  HEATH Dickinson for chart documentation. 1/21/2025  12:36 EST

## 2025-01-17 ENCOUNTER — TELEPHONE (OUTPATIENT)
Dept: PSYCHIATRY | Facility: CLINIC | Age: 25
End: 2025-01-17
Payer: COMMERCIAL

## 2025-01-17 DIAGNOSIS — F90.9 ATTENTION DEFICIT HYPERACTIVITY DISORDER (ADHD), UNSPECIFIED ADHD TYPE: ICD-10-CM

## 2025-01-17 RX ORDER — ATOMOXETINE 40 MG/1
40 CAPSULE ORAL EVERY MORNING
Qty: 30 CAPSULE | Refills: 1 | Status: SHIPPED | OUTPATIENT
Start: 2025-01-17

## 2025-01-17 NOTE — TELEPHONE ENCOUNTER
PT PHONED IN.    PT REPORTS THAT SHE HASN'T NOTICED MUCH OF A DIFFERENCE WITH HER STRATTERA SINCE STARTING IT AT ITS CURRENT DOSE.     SHE WOULD LIKE TO SEE IF THIS CAN GO AHEAD AND BE INCREASED BEFORE HER NEXT APPT; THAT WAY IF THE INCREASE DOESN'T WORK THEN BY HER NEXT APPT IT CAN BE DISCUSSED FURTHER.    ROUTING TO COVERING PROVIDERS.

## 2025-01-17 NOTE — TELEPHONE ENCOUNTER
Due to potential elevation in blood pressure and heart rate and tapering off Wellbutrin would defer to primary provider before increasing.

## 2025-01-20 ENCOUNTER — OFFICE VISIT (OUTPATIENT)
Dept: FAMILY MEDICINE CLINIC | Facility: CLINIC | Age: 25
End: 2025-01-20
Payer: COMMERCIAL

## 2025-01-20 VITALS
OXYGEN SATURATION: 98 % | HEART RATE: 80 BPM | DIASTOLIC BLOOD PRESSURE: 93 MMHG | TEMPERATURE: 98.1 F | WEIGHT: 189.8 LBS | BODY MASS INDEX: 34.93 KG/M2 | SYSTOLIC BLOOD PRESSURE: 125 MMHG | HEIGHT: 62 IN

## 2025-01-20 DIAGNOSIS — I10 PRIMARY HYPERTENSION: Primary | ICD-10-CM

## 2025-01-20 DIAGNOSIS — K21.9 GASTROESOPHAGEAL REFLUX DISEASE WITHOUT ESOPHAGITIS: ICD-10-CM

## 2025-01-20 DIAGNOSIS — Z23 NEED FOR INFLUENZA VACCINATION: ICD-10-CM

## 2025-01-20 PROCEDURE — 90656 IIV3 VACC NO PRSV 0.5 ML IM: CPT | Performed by: NURSE PRACTITIONER

## 2025-01-20 PROCEDURE — 90471 IMMUNIZATION ADMIN: CPT | Performed by: NURSE PRACTITIONER

## 2025-01-20 PROCEDURE — 99214 OFFICE O/P EST MOD 30 MIN: CPT | Performed by: NURSE PRACTITIONER

## 2025-01-20 RX ORDER — FAMOTIDINE 40 MG/1
40 TABLET, FILM COATED ORAL
Qty: 90 TABLET | Refills: 1 | Status: SHIPPED | OUTPATIENT
Start: 2025-01-20

## 2025-01-20 RX ORDER — HYDROCHLOROTHIAZIDE 25 MG/1
25 TABLET ORAL EVERY MORNING
Qty: 90 TABLET | Refills: 1 | Status: SHIPPED | OUTPATIENT
Start: 2025-01-20

## 2025-01-20 NOTE — TELEPHONE ENCOUNTER
I ATTEMPTED TO CONTACT PT VIA TELEPHONE.    NO ANSWER.    VOICEMAIL MESSAGE LEFT INDICATING THAT INCREASED DOSAGE WAS SENT INTO THE PHARMACY AND TO CALL OUR OFFICE BACK IF SHE HAS ANY QUESTIONS.

## 2025-01-21 PROBLEM — R03.0 ELEVATED BLOOD PRESSURE READING WITHOUT DIAGNOSIS OF HYPERTENSION: Status: ACTIVE | Noted: 2025-01-21

## 2025-02-10 ENCOUNTER — LAB (OUTPATIENT)
Dept: LAB | Facility: HOSPITAL | Age: 25
End: 2025-02-10
Payer: COMMERCIAL

## 2025-02-10 DIAGNOSIS — K21.9 GASTROESOPHAGEAL REFLUX DISEASE WITHOUT ESOPHAGITIS: ICD-10-CM

## 2025-02-10 LAB — H PYLORI IGG SER IA-ACNC: NEGATIVE

## 2025-02-10 PROCEDURE — 86677 HELICOBACTER PYLORI ANTIBODY: CPT

## 2025-02-10 PROCEDURE — 36415 COLL VENOUS BLD VENIPUNCTURE: CPT

## 2025-02-12 ENCOUNTER — TELEPHONE (OUTPATIENT)
Dept: PSYCHIATRY | Facility: CLINIC | Age: 25
End: 2025-02-12
Payer: COMMERCIAL

## 2025-02-12 DIAGNOSIS — Z51.81 THERAPEUTIC DRUG MONITORING: Primary | ICD-10-CM

## 2025-02-12 NOTE — TELEPHONE ENCOUNTER
PT PHONED IN.    SHE IS WANTING TO CHANGE HER MEDICATION STRATTERA TO SOMETHING ELSE.    STRATTERA ISN'T WORKING FOR HER; SHE DOESN'T FEEL ANY DIFFERENCES.    SHE WOULD PREFER TO TRY A MEDICATION THAT IS A STIMULANT.

## 2025-02-13 DIAGNOSIS — F41.1 GAD (GENERALIZED ANXIETY DISORDER): ICD-10-CM

## 2025-02-13 NOTE — TELEPHONE ENCOUNTER
Understood. We can begin working on this. I'll need her to have a drug screen collected in the coming week and to sign a controlled substance agreement next we meet in office. We'll likely first try Adderall XR. Potential SE include tolerance and dependence with long-term daily use, diminished appetite, mood changes, insomnia, hypertension, anxiety, irritability, dry mouth, headaches. I would send the medication after I receive the drug screen results.

## 2025-02-14 NOTE — TELEPHONE ENCOUNTER
PT(PATIENT) VERIFIED     CONTACTED PT(PATIENT) PER PROVIDER'S INSTRUCTIONS     Urine Drug Screen - Urine, Clean Catch (2025 16:05)     PT(PATIENT) VERBALIZED UNDERSTANDING AND HAD NO FURTHER QUESTIONS AT THIS TIME

## 2025-02-14 NOTE — TELEPHONE ENCOUNTER
I ATTEMPTED TO CONTACT PT VIA TELEPHONE TO INFORM OF PROVIDERS MESSAGE.    NO ANSWER.    VOICEMAIL MESSAGE LEFT REQUESTING THAT PT CALL OUR OFFICE BACK.

## 2025-02-14 NOTE — TELEPHONE ENCOUNTER
NEXT VISIT WITH PROVIDER   Appointment with Moises Benedict MD (03/03/2025)     LAST SEEN BY PROVIDER   Office Visit with Moises Benedict MD (12/09/2024)     LAST MED REFILL  Vortioxetine HBr (TRINTELLIX) 10 MG tablet tablet (11/07/2024)     PROVIDER PLEASE ADVISE

## 2025-02-17 ENCOUNTER — LAB (OUTPATIENT)
Dept: LAB | Facility: HOSPITAL | Age: 25
End: 2025-02-17
Payer: COMMERCIAL

## 2025-02-17 ENCOUNTER — OFFICE VISIT (OUTPATIENT)
Dept: PSYCHIATRY | Facility: CLINIC | Age: 25
End: 2025-02-17
Payer: COMMERCIAL

## 2025-02-17 VITALS
SYSTOLIC BLOOD PRESSURE: 130 MMHG | HEART RATE: 82 BPM | DIASTOLIC BLOOD PRESSURE: 88 MMHG | WEIGHT: 197.6 LBS | BODY MASS INDEX: 36.36 KG/M2 | HEIGHT: 62 IN

## 2025-02-17 DIAGNOSIS — F33.41 MAJOR DEPRESSIVE DISORDER, RECURRENT EPISODE, IN PARTIAL REMISSION: ICD-10-CM

## 2025-02-17 DIAGNOSIS — F32.81 PMDD (PREMENSTRUAL DYSPHORIC DISORDER): ICD-10-CM

## 2025-02-17 DIAGNOSIS — F41.1 GAD (GENERALIZED ANXIETY DISORDER): ICD-10-CM

## 2025-02-17 DIAGNOSIS — F90.9 ATTENTION DEFICIT HYPERACTIVITY DISORDER (ADHD), UNSPECIFIED ADHD TYPE: Primary | ICD-10-CM

## 2025-02-17 LAB
AMPHET+METHAMPHET UR QL: NEGATIVE
AMPHETAMINES UR QL: NEGATIVE
BARBITURATES UR QL SCN: NEGATIVE
BENZODIAZ UR QL SCN: NEGATIVE
BUPRENORPHINE SERPL-MCNC: NEGATIVE NG/ML
CANNABINOIDS SERPL QL: NEGATIVE
COCAINE UR QL: NEGATIVE
FENTANYL UR-MCNC: NEGATIVE NG/ML
METHADONE UR QL SCN: NEGATIVE
OPIATES UR QL: NEGATIVE
OXYCODONE UR QL SCN: NEGATIVE
PCP UR QL SCN: NEGATIVE
TRICYCLICS UR QL SCN: NEGATIVE

## 2025-02-17 PROCEDURE — 80307 DRUG TEST PRSMV CHEM ANLYZR: CPT | Performed by: PSYCHIATRY & NEUROLOGY

## 2025-02-17 RX ORDER — DEXTROAMPHETAMINE SACCHARATE, AMPHETAMINE ASPARTATE MONOHYDRATE, DEXTROAMPHETAMINE SULFATE AND AMPHETAMINE SULFATE 3.75; 3.75; 3.75; 3.75 MG/1; MG/1; MG/1; MG/1
15 CAPSULE, EXTENDED RELEASE ORAL EVERY MORNING
Qty: 30 CAPSULE | Refills: 0 | Status: SHIPPED | OUTPATIENT
Start: 2025-02-17

## 2025-02-17 NOTE — TELEPHONE ENCOUNTER
Called pt to remind her about the uds, which she says she is going to get done before her appt today.  No further questions at this time
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
## 2025-03-19 ENCOUNTER — PATIENT ROUNDING (BHMG ONLY) (OUTPATIENT)
Dept: URGENT CARE | Facility: CLINIC | Age: 25
End: 2025-03-19
Payer: COMMERCIAL

## 2025-03-31 ENCOUNTER — OFFICE VISIT (OUTPATIENT)
Dept: PSYCHIATRY | Facility: CLINIC | Age: 25
End: 2025-03-31
Payer: COMMERCIAL

## 2025-03-31 VITALS
SYSTOLIC BLOOD PRESSURE: 134 MMHG | BODY MASS INDEX: 36.99 KG/M2 | DIASTOLIC BLOOD PRESSURE: 95 MMHG | WEIGHT: 201 LBS | HEIGHT: 62 IN | HEART RATE: 71 BPM

## 2025-03-31 DIAGNOSIS — F33.1 MAJOR DEPRESSIVE DISORDER, RECURRENT EPISODE, MODERATE: ICD-10-CM

## 2025-03-31 DIAGNOSIS — F41.1 GAD (GENERALIZED ANXIETY DISORDER): ICD-10-CM

## 2025-03-31 DIAGNOSIS — F90.9 ATTENTION DEFICIT HYPERACTIVITY DISORDER (ADHD), UNSPECIFIED ADHD TYPE: Primary | ICD-10-CM

## 2025-03-31 PROCEDURE — 99214 OFFICE O/P EST MOD 30 MIN: CPT | Performed by: PSYCHIATRY & NEUROLOGY
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
## 2025-04-07 DIAGNOSIS — Z30.41 ENCOUNTER FOR SURVEILLANCE OF CONTRACEPTIVE PILLS: ICD-10-CM

## 2025-04-07 RX ORDER — LEVONORGESTREL / ETHINYL ESTRADIOL AND ETHINYL ESTRADIOL 150-30(84)
1 KIT ORAL DAILY
Qty: 91 TABLET | Refills: 1 | Status: SHIPPED | OUTPATIENT
Start: 2025-04-07

## 2025-04-10 NOTE — PROGRESS NOTES
Chief Complaint  Hypertension, Heartburn, and PCOS    Subjective          Giselle Talavera is a 24 y.o. female who presents to [facility]    Abdominal Pain  Chronicity:  Chronic  Onset:  More than 1 year ago  Onset quality:  Sudden  Frequency:  Every several days  Progression since onset:  Coming and going  Episode duration:  Hours  Pain location:  Epigastric region  Pain - numeric:  8/10  Pain quality:  Cramping and sharp  Radiates to:  LUQ, RUQ and epigastric region  Associated symptoms: anorexia, belching and constipation    Associated symptoms: no arthralgias, no diarrhea, no dysuria, no fever, no flatus, no frequency, no hematochezia, no hematuria, no melena, no myalgias, no nausea, no vomiting and no weight loss    Aggravated by:  Certain positions, eating and palpation  Relieved by:  Activity, belching, bowel movements, certain positions and passing flatus    History of Present Illness  The patient presents for evaluation of hypertension, hyperhidrosis, and acid reflux.    She reports that her blood pressure readings at home have been consistent with those observed in the clinic. She is currently on a regimen of hydrochlorothiazide, taken daily in the morning. Her diet includes a significant amount of sodium, primarily from prepackaged and takeout foods. She also experiences heartburn and acid reflux, however patient has been taking Pepcid daily however she has been out of omeprazole over-the-counter due to her insurance will not cover the OTC dosage.She typically experiences reflux when she eats close to bedtime, often after her night shifts. She occasionally takes Pepcid at bedtime, which provides some relief. She has not identified any specific food triggers for her reflux    She questions the diagnosis of  polycystic ovary syndrome (PCOS) and experiences excessive sweating, particularly on her face, which she finds embarrassing. She also reports frequent feelings of heat.  Her weight has been fluctuating, despite maintaining a consistent lifestyle. She has not undergone any transvaginal ultrasounds or been evaluated for ovarian cysts. She has been on birth control for an extended period due to irregular, painful, and heavy periods.     She is currently under the care of a psychiatrist and reports that her current medications, Trintellix, BuSpar (as needed), and Adderall, have been beneficial. She has been taking Adderall approximately 3 to 4 times per week, as she was informed that regular use could lead to dependency and necessitate higher doses.      The PHQ has not been completed during this encounter.           There are no preventive care reminders to display for this patient.       Review of Systems   Constitutional:  Negative for fever and weight loss.   Gastrointestinal:  Positive for abdominal pain, anorexia and constipation. Negative for diarrhea, flatus, hematochezia, melena, nausea and vomiting.   Genitourinary:  Negative for dysuria, frequency and hematuria.   Musculoskeletal:  Negative for arthralgias and myalgias.          Medical History: has a past medical history of ADHD (attention deficit hyperactivity disorder), Allergic, Anxiety and depression (06/03/2019), Chronic fatigue, unspecified, Hypothyroidism, and Trauma.     Surgical History: has a past surgical history that includes Adenoidectomy and Tonsillectomy.     Family History: family history includes Anxiety disorder in her paternal grandmother; Asthma in her father; Diabetes in her paternal grandfather; Heart disease in an other family member; Hypertension in her maternal grandfather, paternal grandfather, and another family member; No Known Problems in her brother, cousin, maternal aunt, maternal grandmother, maternal uncle, mother, paternal aunt, paternal uncle, and sister.     Social History: reports that she has never smoked. She has been exposed to tobacco smoke. She has never used smokeless tobacco. She  reports that she does not drink alcohol and does not use drugs.    Allergies: Patient has no known allergies.      Current Outpatient Medications:     amphetamine-dextroamphetamine XR (Adderall XR) 15 MG 24 hr capsule, Take 1 capsule by mouth Every Morning, Disp: 30 capsule, Rfl: 0    busPIRone (BUSPAR) 5 MG tablet, Take 1 tablet by mouth 3 (Three) Times a Day As Needed (anxiety)., Disp: 270 tablet, Rfl: 0    cetirizine (zyrTEC) 10 MG tablet, Take 1 tablet by mouth As Needed (As Needed)., Disp: 90 tablet, Rfl: 2    famotidine (Pepcid) 40 MG tablet, Take 1 tablet by mouth every night at bedtime., Disp: 90 tablet, Rfl: 1    fluticasone (FLONASE) 50 MCG/ACT nasal spray, Administer 2 sprays into the nostril(s) as directed by provider Daily., Disp: 16 g, Rfl: 5    hydroCHLOROthiazide 25 MG tablet, Take 1 tablet by mouth Every Morning., Disp: 90 tablet, Rfl: 1    Levonorgest-Eth Estrad 91-Day 0.15-0.03 &0.01 MG, TAKE 1 TABLET BY MOUTH EVERY DAY, Disp: 91 tablet, Rfl: 1    levothyroxine (SYNTHROID, LEVOTHROID) 50 MCG tablet, TAKE 1 TABLET BY MOUTH EVERY DAY, Disp: 90 tablet, Rfl: 1    montelukast (Singulair) 10 MG tablet, Take 1 tablet by mouth Every Night., Disp: 90 tablet, Rfl: 1    omeprazole (priLOSEC) 40 MG capsule, Take 1 capsule by mouth Every Morning., Disp: 90 capsule, Rfl: 1    Vortioxetine HBr (TRINTELLIX) 10 MG tablet tablet, Take 1 tablet by mouth Daily With Breakfast for 90 days., Disp: 90 tablet, Rfl: 0    glycopyrrolate (Robinul) 1 MG tablet, Take 1 tablet by mouth Daily., Disp: 30 tablet, Rfl: 1      Immunization History   Administered Date(s) Administered    COVID-19 (MODERNA) 1st,2nd,3rd Dose Monovalent 05/12/2021, 06/10/2021    Flu Vaccine Quad PF >36MO 12/17/2018    Flu Vaccine Split Quad 10/18/2019    Fluzone  >6mos 01/13/2021, 01/20/2025    Fluzone (or Fluarix & Flulaval for VFC) >6mos 10/07/2021, 10/04/2023    Fluzone Quad >6mos (Multi-dose) 01/13/2021, 10/01/2021    Influenza, Unspecified  "01/13/2021, 10/07/2021    Tdap 12/17/2018, 12/17/2018         Objective       Vitals:    05/15/25 0732   BP: 122/90   BP Location: Left arm   Patient Position: Sitting   Cuff Size: Adult   Pulse: 80   Resp: 18   Temp: 98.3 °F (36.8 °C)   TempSrc: Temporal   SpO2: 99%   Weight: 88 kg (194 lb)   Height: 157.5 cm (62\")   PainSc: 0-No pain      Body mass index is 35.48 kg/m².   Wt Readings from Last 3 Encounters:   05/15/25 88 kg (194 lb)   04/28/25 87.7 kg (193 lb 6.4 oz)   03/31/25 91.2 kg (201 lb)      BP Readings from Last 3 Encounters:   05/15/25 122/90   04/28/25 130/87   03/31/25 134/95             Physical Exam  Vitals reviewed.   Constitutional:       Appearance: Normal appearance. She is well-developed.   HENT:      Head: Normocephalic and atraumatic.   Eyes:      Conjunctiva/sclera: Conjunctivae normal.      Pupils: Pupils are equal, round, and reactive to light.   Cardiovascular:      Rate and Rhythm: Normal rate and regular rhythm.      Heart sounds: Normal heart sounds. No murmur heard.  Pulmonary:      Effort: Pulmonary effort is normal.      Breath sounds: Normal breath sounds. No wheezing or rhonchi.   Abdominal:      General: Bowel sounds are normal. There is no distension.      Palpations: Abdomen is soft.      Tenderness: There is no abdominal tenderness.   Skin:     General: Skin is warm and dry.   Neurological:      Mental Status: She is alert and oriented to person, place, and time.   Psychiatric:         Mood and Affect: Mood and affect normal.         Behavior: Behavior normal.         Thought Content: Thought content normal.         Judgment: Judgment normal.         Physical Exam  Respiratory: Clear to auscultation, no wheezing, rales or rhonchi      Result Review :   Results           Common labs          7/8/2024    14:10   Common Labs   Glucose 84    BUN 12    Creatinine 0.83    Sodium 139    Potassium 4.2    Chloride 106    Calcium 9.6    Albumin 4.2    Total Bilirubin 0.4    Alkaline " Phosphatase 68    AST (SGOT) 14    ALT (SGPT) 13    WBC 9.45    Hemoglobin 14.8    Hematocrit 44.5    Platelets 318    Total Cholesterol 211    Triglycerides 111    HDL Cholesterol 50    LDL Cholesterol  141                     Assessment and Plan        Diagnoses and all orders for this visit:    1. Hyperhidrosis (Primary)  -     glycopyrrolate (Robinul) 1 MG tablet; Take 1 tablet by mouth Daily.  Dispense: 30 tablet; Refill: 1    2. Primary hypertension  -     hydroCHLOROthiazide 25 MG tablet; Take 1 tablet by mouth Every Morning.  Dispense: 90 tablet; Refill: 1    3. Gastroesophageal reflux disease without esophagitis  -     famotidine (Pepcid) 40 MG tablet; Take 1 tablet by mouth every night at bedtime.  Dispense: 90 tablet; Refill: 1  -     omeprazole (priLOSEC) 40 MG capsule; Take 1 capsule by mouth Every Morning.  Dispense: 90 capsule; Refill: 1        Assessment & Plan  1. Hypertension.  - Her diastolic blood pressure is slightly elevated, which could be attributed to her high salt intake.  - Adderall can increase blood pressure, but it is necessary for her condition.  - She was advised to reduce her salt consumption by avoiding prepackaged and takeout foods. A prescription refill for hydrochlorothiazide was provided.  - She was instructed to monitor her blood pressure at home three times a week and report if the systolic reading exceeds 135 or the diastolic reading surpasses 85. If the diastolic reading remains above 90 despite dietary modifications, consideration will be given to initiating a dual water pill regimen.    2. Hyperhidrosis.  - Her hyperhidrosis could be a result of her elevated blood pressure or PCOS, which can lead to weight gain due to insulin resistance.  - This weight gain could potentially exacerbate her sweating.   - She was informed about the potential benefits of Robinul in managing her sweating.  - A prescription for Robinul was provided. She was also offered a referral to  dermatology for further discussion.    3. Gastroesophageal reflux disease (GERD).  - She was informed about the potential long-term effects of omeprazole, including decreased calcium absorption and bone health.  - However, it was emphasized that untreated reflux could lead to esophageal irritation and increase the risk of esophageal cancer.  - Her omeprazole dosage was adjusted to 40 mg tablets, to be taken every other day.  - She was advised to continue taking Pepcid nightly.    4. Depression.  - She reports that Trintellix, BuSpar as needed, and Adderall are helping her feel more productive and motivated.  - No changes to her current regimen were made.    Return for Next scheduled follow up.    Patient was given instructions and counseling regarding her condition or for health maintenance advice. Please see specific information pulled into the AVS if appropriate.     HEATH Dickinson    Patient or patient representative verbalized consent for the use of Ambient Listening during the visit with  HEATH Dickinson for chart documentation. 5/15/2025  07:56 EDT

## 2025-04-11 DIAGNOSIS — E03.9 HYPOTHYROIDISM, UNSPECIFIED TYPE: ICD-10-CM

## 2025-04-11 RX ORDER — LEVOTHYROXINE SODIUM 50 UG/1
50 TABLET ORAL DAILY
Qty: 90 TABLET | Refills: 1 | Status: SHIPPED | OUTPATIENT
Start: 2025-04-11

## 2025-04-28 ENCOUNTER — OFFICE VISIT (OUTPATIENT)
Dept: PSYCHIATRY | Facility: CLINIC | Age: 25
End: 2025-04-28
Payer: COMMERCIAL

## 2025-04-28 VITALS
DIASTOLIC BLOOD PRESSURE: 87 MMHG | HEART RATE: 76 BPM | SYSTOLIC BLOOD PRESSURE: 130 MMHG | WEIGHT: 193.4 LBS | BODY MASS INDEX: 35.59 KG/M2 | HEIGHT: 62 IN

## 2025-04-28 DIAGNOSIS — F90.9 ATTENTION DEFICIT HYPERACTIVITY DISORDER (ADHD), UNSPECIFIED ADHD TYPE: Primary | ICD-10-CM

## 2025-04-28 DIAGNOSIS — F33.41 MAJOR DEPRESSIVE DISORDER, RECURRENT EPISODE, IN PARTIAL REMISSION: ICD-10-CM

## 2025-04-28 DIAGNOSIS — F41.1 GAD (GENERALIZED ANXIETY DISORDER): ICD-10-CM

## 2025-04-28 PROCEDURE — 99214 OFFICE O/P EST MOD 30 MIN: CPT | Performed by: PSYCHIATRY & NEUROLOGY

## 2025-04-28 RX ORDER — DEXTROAMPHETAMINE SACCHARATE, AMPHETAMINE ASPARTATE MONOHYDRATE, DEXTROAMPHETAMINE SULFATE AND AMPHETAMINE SULFATE 3.75; 3.75; 3.75; 3.75 MG/1; MG/1; MG/1; MG/1
15 CAPSULE, EXTENDED RELEASE ORAL EVERY MORNING
Qty: 30 CAPSULE | Refills: 0 | Status: SHIPPED | OUTPATIENT
Start: 2025-04-28
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
## 2025-04-30 DIAGNOSIS — J30.1 SEASONAL ALLERGIC RHINITIS DUE TO POLLEN: ICD-10-CM

## 2025-05-01 RX ORDER — FLUTICASONE PROPIONATE 50 MCG
2 SPRAY, SUSPENSION (ML) NASAL DAILY
Qty: 16 G | Refills: 5 | Status: SHIPPED | OUTPATIENT
Start: 2025-05-01

## 2025-05-15 ENCOUNTER — OFFICE VISIT (OUTPATIENT)
Dept: FAMILY MEDICINE CLINIC | Facility: CLINIC | Age: 25
End: 2025-05-15
Payer: COMMERCIAL

## 2025-05-15 VITALS
OXYGEN SATURATION: 99 % | BODY MASS INDEX: 35.7 KG/M2 | HEART RATE: 80 BPM | TEMPERATURE: 98.3 F | DIASTOLIC BLOOD PRESSURE: 90 MMHG | WEIGHT: 194 LBS | SYSTOLIC BLOOD PRESSURE: 122 MMHG | HEIGHT: 62 IN | RESPIRATION RATE: 18 BRPM

## 2025-05-15 DIAGNOSIS — I10 PRIMARY HYPERTENSION: ICD-10-CM

## 2025-05-15 DIAGNOSIS — R61 HYPERHIDROSIS: Primary | ICD-10-CM

## 2025-05-15 DIAGNOSIS — K21.9 GASTROESOPHAGEAL REFLUX DISEASE WITHOUT ESOPHAGITIS: ICD-10-CM

## 2025-05-15 PROCEDURE — 99214 OFFICE O/P EST MOD 30 MIN: CPT | Performed by: NURSE PRACTITIONER

## 2025-05-15 RX ORDER — FAMOTIDINE 40 MG/1
40 TABLET, FILM COATED ORAL
Qty: 90 TABLET | Refills: 1 | Status: SHIPPED | OUTPATIENT
Start: 2025-05-15

## 2025-05-15 RX ORDER — HYDROCHLOROTHIAZIDE 25 MG/1
25 TABLET ORAL EVERY MORNING
Qty: 90 TABLET | Refills: 1 | Status: SHIPPED | OUTPATIENT
Start: 2025-05-15

## 2025-05-15 RX ORDER — OMEPRAZOLE 40 MG/1
40 CAPSULE, DELAYED RELEASE ORAL EVERY MORNING
Qty: 90 CAPSULE | Refills: 1 | Status: SHIPPED | OUTPATIENT
Start: 2025-05-15

## 2025-05-15 RX ORDER — GLYCOPYRROLATE 1 MG/1
1 TABLET ORAL DAILY
Qty: 30 TABLET | Refills: 1 | Status: SHIPPED | OUTPATIENT
Start: 2025-05-15

## 2025-07-08 NOTE — PROGRESS NOTES
Chief Complaint    Annual Exam  Annual Exam    Subjective          Giselle Talavera is a 24 y.o. female who presents to [facility]    Annual Exam    History of Present Illness  The patient presents for a well-woman exam.    She reports that her birth control is effective, with occasional breakthrough bleeding when she misses a dose. Her menstrual cycle occurs every 90 days, characterized by heavy flow and clotting initially, but the duration has decreased from 8 days to 3 or 4 days. The heavy flow does not interfere with her daily activities.    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 She finds omeprazole and famotidine beneficial and has not experienced more than two reflux episodes per week recently.    She takes her thyroid medication on an empty stomach each morning and a diuretic in the morning without any issues. She has discontinued glycopyrrolate due se and she was only sweating in her hot work environment.     She purchases Zyrtec over-the-counter as her insurance does not cover it. She uses Singulair only during severe allergy flare-ups, which have been infrequent recently.    She is under the care of Dr. Jacinto for psychiatric medications.    She is due for an eye exam, which she plans to schedule soon. Her last full panel of blood work was done a year ago.    GYNECOLOGICAL HISTORY:  Cycle Length: 90 days  Duration: 3-4 days  Frequency and Flow: Heavy flow    FAMILY HISTORY  She is not aware of any family history of breast cancer or colon cancer.          Health Maintenance Due   Topic Date Due    ANNUAL PHYSICAL  07/08/2025          The PHQ has not been completed during this encounter.          Review of Systems   Constitutional:  Negative for chills, fatigue and fever.   Respiratory:  Negative for cough and shortness of breath.    Cardiovascular:  Negative for chest pain and palpitations.   Gastrointestinal:   Negative for constipation, diarrhea, nausea and vomiting.   Musculoskeletal:  Negative for back pain and neck pain.   Skin:  Negative for rash.   Neurological:  Negative for dizziness and headaches.          Medical History: has a past medical history of ADHD (attention deficit hyperactivity disorder), Allergic, Anxiety and depression (06/03/2019), Chronic fatigue, unspecified, Hypothyroidism, and Trauma.     Surgical History: has a past surgical history that includes Adenoidectomy and Tonsillectomy.     Family History: family history includes Anxiety disorder in her paternal grandmother; Asthma in her father; Diabetes in her paternal grandfather; Heart disease in an other family member; Hypertension in her maternal grandfather, paternal grandfather, and another family member; No Known Problems in her brother, cousin, maternal aunt, maternal grandmother, maternal uncle, mother, paternal aunt, paternal uncle, and sister.     Social History: reports that she has never smoked. She has been exposed to tobacco smoke. She has never used smokeless tobacco. She reports that she does not drink alcohol and does not use drugs.    Allergies: Patient has no known allergies.        Current Outpatient Medications:     amphetamine-dextroamphetamine XR (Adderall XR) 15 MG 24 hr capsule, Take 1 capsule by mouth Every Morning, Disp: 30 capsule, Rfl: 0    busPIRone (BUSPAR) 5 MG tablet, TAKE 1 TABLET BY MOUTH 3 (THREE) TIMES A DAY AS NEEDED (ANXIETY)., Disp: 270 tablet, Rfl: 0    cetirizine (zyrTEC) 10 MG tablet, Take 1 tablet by mouth As Needed (As Needed)., Disp: 90 tablet, Rfl: 2    famotidine (Pepcid) 40 MG tablet, Take 1 tablet by mouth every night at bedtime., Disp: 90 tablet, Rfl: 1    fluticasone (FLONASE) 50 MCG/ACT nasal spray, Administer 2 sprays into the nostril(s) as directed by provider Daily., Disp: 16 g, Rfl: 5    hydroCHLOROthiazide 25 MG tablet, Take 1 tablet by mouth Every Morning., Disp: 90 tablet, Rfl: 1     "Levonorgest-Eth Estrad 91-Day 0.15-0.03 &0.01 MG, Take 1 tablet by mouth Daily., Disp: 91 tablet, Rfl: 3    levothyroxine (SYNTHROID, LEVOTHROID) 50 MCG tablet, TAKE 1 TABLET BY MOUTH EVERY DAY, Disp: 90 tablet, Rfl: 1    montelukast (Singulair) 10 MG tablet, Take 1 tablet by mouth Every Night., Disp: 90 tablet, Rfl: 1    omeprazole (priLOSEC) 40 MG capsule, Take 1 capsule by mouth Every Morning., Disp: 90 capsule, Rfl: 1    Vortioxetine HBr (TRINTELLIX) 10 MG tablet tablet, Take 1 tablet by mouth Daily With Breakfast for 90 days., Disp: 90 tablet, Rfl: 0      Immunization History   Administered Date(s) Administered    COVID-19 (MODERNA) 1st,2nd,3rd Dose Monovalent 05/12/2021, 06/10/2021    Flu Vaccine Quad PF >36MO 12/17/2018    Flu Vaccine Split Quad 10/18/2019    Fluzone  >6mos 01/13/2021, 01/20/2025    Fluzone (or Fluarix & Flulaval for VFC) >6mos 10/07/2021, 10/04/2023    Fluzone Quad >6mos (Multi-dose) 01/13/2021, 10/01/2021    Influenza, Unspecified 01/13/2021, 10/07/2021    Tdap 12/17/2018, 12/17/2018         Objective       Vitals:    07/14/25 1508   BP: 121/86   Pulse: 69   Temp: 97.7 °F (36.5 °C)   TempSrc: Temporal   SpO2: 99%   Weight: 86.9 kg (191 lb 9.6 oz)   Height: 157.5 cm (62.01\")      Body mass index is 35.04 kg/m².   Wt Readings from Last 3 Encounters:   07/14/25 86.9 kg (191 lb 9.6 oz)   05/15/25 88 kg (194 lb)   04/28/25 87.7 kg (193 lb 6.4 oz)      BP Readings from Last 3 Encounters:   07/14/25 121/86   05/15/25 122/90   04/28/25 130/87                 Physical Exam  Vitals reviewed.   Constitutional:       Appearance: Normal appearance. She is well-developed.   HENT:      Head: Normocephalic and atraumatic.   Eyes:      Conjunctiva/sclera: Conjunctivae normal.      Pupils: Pupils are equal, round, and reactive to light.   Cardiovascular:      Rate and Rhythm: Normal rate and regular rhythm.      Heart sounds: Normal heart sounds. No murmur heard.  Pulmonary:      Effort: Pulmonary effort is " normal.      Breath sounds: Normal breath sounds. No wheezing or rhonchi.   Abdominal:      General: Bowel sounds are normal. There is no distension.      Palpations: Abdomen is soft.      Tenderness: There is no abdominal tenderness.   Skin:     General: Skin is warm and dry.   Neurological:      Mental Status: She is alert and oriented to person, place, and time.   Psychiatric:         Mood and Affect: Mood and affect normal.         Behavior: Behavior normal.         Thought Content: Thought content normal.         Judgment: Judgment normal.       Physical Exam  Cardiovascular: Regular rate and rhythm, no murmurs, rubs, or gallops      Result Review :         Results                   Assessment and Plan          Diagnoses and all orders for this visit:    1. Annual physical exam (Primary)    2. Primary hypertension  -     CBC (No Diff); Future  -     CBC (No Diff)    3. Hypothyroidism, unspecified type  -     TSH; Future  -     TSH    4. Screening for lipid disorders  -     Lipid Panel; Future  -     Comprehensive Metabolic Panel; Future  -     Lipid Panel  -     Comprehensive Metabolic Panel    5. Encounter for surveillance of contraceptive pills  -     Levonorgest-Eth Estrad 91-Day 0.15-0.03 &0.01 MG; Take 1 tablet by mouth Daily.  Dispense: 91 tablet; Refill: 3        Assessment & Plan  1. Well-woman exam.  - Blood pressure readings are within the normal range.  - Last Pap smear conducted in 2023; next one due in 03/2026.  - Comprehensive blood work panel will be ordered today.  - Birth control prescription will be refilled.  - Dental and eye exams are up to date.    2. Reflux.  - Reports improvement in reflux symptoms.  - Currently taking omeprazole and famotidine.  - Advised to continue medications and avoid eating close to bedtime.  - No more than two flares of reflux per week.    3. Thyroid disorder.  - Taking thyroid medication every morning on an empty stomach.  - Blood pressure is good.  - No changes  to the current regimen are necessary.      4. Excessive sweating.  - Decided to discontinue glycopyrrolate due to side effects.  - Works in a hot environment without AC.  - Sweating is considered a natural response to heat.  - Glycopyrrolate removed from the medication list.    5. Allergies.  - Managing allergies with over-the-counter Zyrtec.  - Uses Singulair only when symptoms are severe.  - No refills needed at this time.      6. Psychiatric management.  - Seeing Dr. Coyne for psychiatric medications.      7. Menometrorrhagia  - Birth control is going well with occasional breakthrough bleeding due to missed pills.  - Periods are heavy but shorter, lasting about three to four days.  - No issues with clotting or heavy flow impacting daily activities.      Updated annual wellness visit checklist.  Immunizations discussed.  Screening discussed and/or ordered.  Recommend yearly dental and eye exams. Also discussed monitoring of blood pressure and lipids.      Patient was given instructions and counseling regarding her condition or for health maintenance advice. Please see specific information pulled into the AVS if appropriate.     Patient or patient representative verbalized consent for the use of Ambient Listening during the visit with  HEATH Dickinson for chart documentation. 7/14/2025  15:52 EDT    HEATH Dickinson

## 2025-07-10 DIAGNOSIS — F32.81 PMDD (PREMENSTRUAL DYSPHORIC DISORDER): ICD-10-CM

## 2025-07-10 DIAGNOSIS — F41.1 GAD (GENERALIZED ANXIETY DISORDER): ICD-10-CM

## 2025-07-10 RX ORDER — BUSPIRONE HYDROCHLORIDE 5 MG/1
5 TABLET ORAL 3 TIMES DAILY PRN
Qty: 270 TABLET | Refills: 0 | Status: SHIPPED | OUTPATIENT
Start: 2025-07-10

## 2025-07-10 NOTE — TELEPHONE ENCOUNTER
NEXT VISIT WITH PROVIDER   Appointment with Moises Benedict MD (07/21/2025)     LAST SEEN BY PROVIDER   Office Visit with Moises Benedict MD (04/28/2025)     LAST MED REFILL  busPIRone (BUSPAR) 5 MG tablet (12/09/2024)     PROVIDER PLEASE ADVISE

## 2025-07-14 ENCOUNTER — OFFICE VISIT (OUTPATIENT)
Dept: FAMILY MEDICINE CLINIC | Facility: CLINIC | Age: 25
End: 2025-07-14
Payer: COMMERCIAL

## 2025-07-14 VITALS
HEART RATE: 69 BPM | TEMPERATURE: 97.7 F | HEIGHT: 62 IN | SYSTOLIC BLOOD PRESSURE: 121 MMHG | BODY MASS INDEX: 35.26 KG/M2 | OXYGEN SATURATION: 99 % | WEIGHT: 191.6 LBS | DIASTOLIC BLOOD PRESSURE: 86 MMHG

## 2025-07-14 DIAGNOSIS — E03.9 HYPOTHYROIDISM, UNSPECIFIED TYPE: ICD-10-CM

## 2025-07-14 DIAGNOSIS — Z30.41 ENCOUNTER FOR SURVEILLANCE OF CONTRACEPTIVE PILLS: ICD-10-CM

## 2025-07-14 DIAGNOSIS — I10 PRIMARY HYPERTENSION: ICD-10-CM

## 2025-07-14 DIAGNOSIS — Z13.220 SCREENING FOR LIPID DISORDERS: ICD-10-CM

## 2025-07-14 DIAGNOSIS — Z00.00 ANNUAL PHYSICAL EXAM: Primary | ICD-10-CM

## 2025-07-14 LAB
ALBUMIN SERPL-MCNC: 4.1 G/DL (ref 3.5–5.2)
ALBUMIN/GLOB SERPL: 1.5 G/DL
ALP SERPL-CCNC: 62 U/L (ref 39–117)
ALT SERPL W P-5'-P-CCNC: 22 U/L (ref 1–33)
ANION GAP SERPL CALCULATED.3IONS-SCNC: 9.6 MMOL/L (ref 5–15)
AST SERPL-CCNC: 21 U/L (ref 1–32)
BILIRUB SERPL-MCNC: 0.5 MG/DL (ref 0–1.2)
BUN SERPL-MCNC: 11 MG/DL (ref 6–20)
BUN/CREAT SERPL: 13.3 (ref 7–25)
CALCIUM SPEC-SCNC: 9.7 MG/DL (ref 8.6–10.5)
CHLORIDE SERPL-SCNC: 105 MMOL/L (ref 98–107)
CHOLEST SERPL-MCNC: 186 MG/DL (ref 0–200)
CO2 SERPL-SCNC: 21.4 MMOL/L (ref 22–29)
CREAT SERPL-MCNC: 0.83 MG/DL (ref 0.57–1)
DEPRECATED RDW RBC AUTO: 41 FL (ref 37–54)
EGFRCR SERPLBLD CKD-EPI 2021: 99.9 ML/MIN/1.73
ERYTHROCYTE [DISTWIDTH] IN BLOOD BY AUTOMATED COUNT: 12.4 % (ref 12.3–15.4)
GLOBULIN UR ELPH-MCNC: 2.7 GM/DL
GLUCOSE SERPL-MCNC: 86 MG/DL (ref 65–99)
HCT VFR BLD AUTO: 42.2 % (ref 34–46.6)
HDLC SERPL-MCNC: 46 MG/DL (ref 40–60)
HGB BLD-MCNC: 13.9 G/DL (ref 12–15.9)
LDLC SERPL CALC-MCNC: 128 MG/DL (ref 0–100)
LDLC/HDLC SERPL: 2.77 {RATIO}
MCH RBC QN AUTO: 29.6 PG (ref 26.6–33)
MCHC RBC AUTO-ENTMCNC: 32.9 G/DL (ref 31.5–35.7)
MCV RBC AUTO: 89.8 FL (ref 79–97)
PLATELET # BLD AUTO: 314 10*3/MM3 (ref 140–450)
PMV BLD AUTO: 9.4 FL (ref 6–12)
POTASSIUM SERPL-SCNC: 4.3 MMOL/L (ref 3.5–5.2)
PROT SERPL-MCNC: 6.8 G/DL (ref 6–8.5)
RBC # BLD AUTO: 4.7 10*6/MM3 (ref 3.77–5.28)
SODIUM SERPL-SCNC: 136 MMOL/L (ref 136–145)
TRIGL SERPL-MCNC: 63 MG/DL (ref 0–150)
TSH SERPL DL<=0.05 MIU/L-ACNC: 4.61 UIU/ML (ref 0.27–4.2)
VLDLC SERPL-MCNC: 12 MG/DL (ref 5–40)
WBC NRBC COR # BLD AUTO: 8.37 10*3/MM3 (ref 3.4–10.8)

## 2025-07-14 PROCEDURE — 80050 GENERAL HEALTH PANEL: CPT | Performed by: NURSE PRACTITIONER

## 2025-07-14 PROCEDURE — 99213 OFFICE O/P EST LOW 20 MIN: CPT | Performed by: NURSE PRACTITIONER

## 2025-07-14 PROCEDURE — 80061 LIPID PANEL: CPT | Performed by: NURSE PRACTITIONER

## 2025-07-14 PROCEDURE — 99395 PREV VISIT EST AGE 18-39: CPT | Performed by: NURSE PRACTITIONER

## 2025-07-14 RX ORDER — LEVONORGESTREL / ETHINYL ESTRADIOL AND ETHINYL ESTRADIOL 150-30(84)
1 KIT ORAL DAILY
Qty: 91 TABLET | Refills: 3 | Status: SHIPPED | OUTPATIENT
Start: 2025-07-14

## 2025-07-14 NOTE — PATIENT INSTRUCTIONS
Preventive Care 40-64 Years Old, Female  Preventive care refers to lifestyle choices and visits with your health care provider that can promote health and wellness. Preventive care visits are also called wellness exams.  What can I expect for my preventive care visit?  Counseling  Your health care provider may ask you questions about your:  Medical history, including:  Past medical problems.  Family medical history.  Pregnancy history.  Current health, including:  Menstrual cycle.  Method of birth control.  Emotional well-being.  Home life and relationship well-being.  Sexual activity and sexual health.  Lifestyle, including:  Alcohol, nicotine or tobacco, and drug use.  Access to firearms.  Diet, exercise, and sleep habits.  Work and work environment.  Sunscreen use.  Safety issues such as seatbelt and bike helmet use.  Physical exam  Your health care provider will check your:  Height and weight. These may be used to calculate your BMI (body mass index). BMI is a measurement that tells if you are at a healthy weight.  Waist circumference. This measures the distance around your waistline. This measurement also tells if you are at a healthy weight and may help predict your risk of certain diseases, such as type 2 diabetes and high blood pressure.  Heart rate and blood pressure.  Body temperature.  Skin for abnormal spots.  What immunizations do I need?    Vaccines are usually given at various ages, according to a schedule. Your health care provider will recommend vaccines for you based on your age, medical history, and lifestyle or other factors, such as travel or where you work.  What tests do I need?  Screening  Your health care provider may recommend screening tests for certain conditions. This may include:  Lipid and cholesterol levels.  Diabetes screening. This is done by checking your blood sugar (glucose) after you have not eaten for a while (fasting).  Pelvic exam and Pap test.  Hepatitis B test.  Hepatitis C  test.  HIV (human immunodeficiency virus) test.  STI (sexually transmitted infection) testing, if you are at risk.  Lung cancer screening.  Colorectal cancer screening.  Mammogram. Talk with your health care provider about when you should start having regular mammograms. This may depend on whether you have a family history of breast cancer.  BRCA-related cancer screening. This may be done if you have a family history of breast, ovarian, tubal, or peritoneal cancers.  Bone density scan. This is done to screen for osteoporosis.  Talk with your health care provider about your test results, treatment options, and if necessary, the need for more tests.  Follow these instructions at home:  Eating and drinking    Eat a diet that includes fresh fruits and vegetables, whole grains, lean protein, and low-fat dairy products.  Take vitamin and mineral supplements as recommended by your health care provider.  Do not drink alcohol if:  Your health care provider tells you not to drink.  You are pregnant, may be pregnant, or are planning to become pregnant.  If you drink alcohol:  Limit how much you have to 0-1 drink a day.  Know how much alcohol is in your drink. In the [place] one drink equals one 12 oz bottle of beer (355 mL), one 5 oz glass of wine (148 mL), or one 1½ oz glass of hard liquor (44 mL).  Lifestyle  Brush your teeth every morning and night with fluoride toothpaste. Floss one time each day.  Exercise for at least 30 minutes 5 or more days each week.  Do not use any products that contain nicotine or tobacco. These products include cigarettes, chewing tobacco, and vaping devices, such as e-cigarettes. If you need help quitting, ask your health care provider.  Do not use drugs.  If you are sexually active, practice safe sex. Use a condom or other form of protection to prevent STIs.  If you do not wish to become pregnant, use a form of birth control. If you plan to become pregnant, see your health care provider for a  prepregnancy visit.  Take aspirin only as told by your health care provider. Make sure that you understand how much to take and what form to take. Work with your health care provider to find out whether it is safe and beneficial for you to take aspirin daily.  Find healthy ways to manage stress, such as:  Meditation, yoga, or listening to music.  Journaling.  Talking to a trusted person.  Spending time with friends and family.  Minimize exposure to UV radiation to reduce your risk of skin cancer.  Safety  Always wear your seat belt while driving or riding in a vehicle.  Do not drive:  If you have been drinking alcohol. Do not ride with someone who has been drinking.  When you are tired or distracted.  While texting.  If you have been using any mind-altering substances or drugs.  Wear a helmet and other protective equipment during sports activities.  If you have firearms in your house, make sure you follow all gun safety procedures.  Seek help if you have been physically or sexually abused.  What's next?  Visit your health care provider once a year for an annual wellness visit.  Ask your health care provider how often you should have your eyes and teeth checked.  Stay up to date on all vaccines.  This information is not intended to replace advice given to you by your health care provider. Make sure you discuss any questions you have with your health care provider.  Document Revised: 06/15/2022 Document Reviewed: 06/15/2022  [employer]vier Patient Education © 2024 [employer]

## 2025-07-21 ENCOUNTER — OFFICE VISIT (OUTPATIENT)
Dept: PSYCHIATRY | Facility: CLINIC | Age: 25
End: 2025-07-21
Payer: COMMERCIAL

## 2025-07-21 VITALS
HEIGHT: 62 IN | WEIGHT: 189 LBS | DIASTOLIC BLOOD PRESSURE: 83 MMHG | SYSTOLIC BLOOD PRESSURE: 136 MMHG | HEART RATE: 89 BPM | BODY MASS INDEX: 34.78 KG/M2

## 2025-07-21 DIAGNOSIS — F90.9 ATTENTION DEFICIT HYPERACTIVITY DISORDER (ADHD), UNSPECIFIED ADHD TYPE: Primary | ICD-10-CM

## 2025-07-21 DIAGNOSIS — F33.41 MAJOR DEPRESSIVE DISORDER, RECURRENT EPISODE, IN PARTIAL REMISSION: ICD-10-CM

## 2025-07-21 DIAGNOSIS — F41.1 GAD (GENERALIZED ANXIETY DISORDER): ICD-10-CM

## 2025-07-21 PROCEDURE — 99214 OFFICE O/P EST MOD 30 MIN: CPT | Performed by: PSYCHIATRY & NEUROLOGY

## 2025-07-21 RX ORDER — DEXTROAMPHETAMINE SACCHARATE, AMPHETAMINE ASPARTATE MONOHYDRATE, DEXTROAMPHETAMINE SULFATE AND AMPHETAMINE SULFATE 3.75; 3.75; 3.75; 3.75 MG/1; MG/1; MG/1; MG/1
15 CAPSULE, EXTENDED RELEASE ORAL EVERY MORNING
Qty: 30 CAPSULE | Refills: 0 | Status: SHIPPED | OUTPATIENT
Start: 2025-07-21
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
## 2025-08-18 DIAGNOSIS — F90.9 ATTENTION DEFICIT HYPERACTIVITY DISORDER (ADHD), UNSPECIFIED ADHD TYPE: ICD-10-CM

## 2025-08-18 RX ORDER — DEXTROAMPHETAMINE SACCHARATE, AMPHETAMINE ASPARTATE MONOHYDRATE, DEXTROAMPHETAMINE SULFATE AND AMPHETAMINE SULFATE 3.75; 3.75; 3.75; 3.75 MG/1; MG/1; MG/1; MG/1
15 CAPSULE, EXTENDED RELEASE ORAL EVERY MORNING
Qty: 30 CAPSULE | Refills: 0 | Status: SHIPPED | OUTPATIENT
Start: 2025-08-18